# Patient Record
Sex: FEMALE | Race: WHITE | NOT HISPANIC OR LATINO | Employment: FULL TIME | URBAN - METROPOLITAN AREA
[De-identification: names, ages, dates, MRNs, and addresses within clinical notes are randomized per-mention and may not be internally consistent; named-entity substitution may affect disease eponyms.]

---

## 2018-07-04 ENCOUNTER — APPOINTMENT (OUTPATIENT)
Dept: GENERAL RADIOLOGY | Facility: CLINIC | Age: 53
End: 2018-07-04
Attending: PHYSICIAN ASSISTANT
Payer: COMMERCIAL

## 2018-07-04 ENCOUNTER — HOSPITAL ENCOUNTER (EMERGENCY)
Facility: CLINIC | Age: 53
Discharge: HOME | End: 2018-07-04
Attending: EMERGENCY MEDICINE | Admitting: EMERGENCY MEDICINE
Payer: COMMERCIAL

## 2018-07-04 VITALS
SYSTOLIC BLOOD PRESSURE: 131 MMHG | BODY MASS INDEX: 19.3 KG/M2 | OXYGEN SATURATION: 99 % | WEIGHT: 123 LBS | HEART RATE: 73 BPM | RESPIRATION RATE: 18 BRPM | TEMPERATURE: 97.6 F | HEIGHT: 67 IN | DIASTOLIC BLOOD PRESSURE: 83 MMHG

## 2018-07-04 DIAGNOSIS — S91.312A LACERATION OF LEFT FOOT, INITIAL ENCOUNTER: ICD-10-CM

## 2018-07-04 PROCEDURE — 99283 EMERGENCY DEPT VISIT LOW MDM: CPT

## 2018-07-04 PROCEDURE — 12001 RPR S/N/AX/GEN/TRNK 2.5CM/<: CPT | Mod: LT

## 2018-07-04 PROCEDURE — 73660 X-RAY EXAM OF TOE(S): CPT | Mod: LT

## 2018-07-04 ASSESSMENT — ENCOUNTER SYMPTOMS
WOUND: 1
NUMBNESS: 0

## 2018-07-04 NOTE — ED AVS SNAPSHOT
Emergency Department    [address] [facility] [address]    Phone:  [phone]    Fax:  [phone]                                       Denise Soulier   MRN: [id number]    Department:   Emergency Department   Date of Visit:  7/4/2018           Patient Information     Date Of Birth          1965        Your diagnoses for this visit were:     2.25 cm Laceration of left foot, initial encounter 8 sutures used       You were seen by Murtaza Thompson MD.      Follow-up Information     Follow up with  Emergency Department In 10 days.    Specialty:  EMERGENCY MEDICINE    Why:  For suture removal. You may also go to the Return if signs of infection develop.     Contact information:    [address] [facility] [address] [phone]        Discharge Instructions       Watch the area surrounding the wound for signs of infection which can include increased redness, drainage, fevers, or swelling. Inspect the area daily. No swimming or baths for the next 3-5 days, showering is ok. Return in 10 days for stitches removal. You may also go to your primary or another clinic for removal. Apply antibacterial ointment such as bacitracin to the wound daily.         24 Hour Appointment Hotline       To make an appointment at any [facility], call [phone] ([phone]). If you don't have a family doctor or clinic, we will help you find one. [place] clinics are conveniently located to serve the needs of you and your family.             Review of your medicines      Our records show that you are taking the medicines listed below. If these are incorrect, please call your family doctor or clinic.        Dose / Directions Last dose taken    ASTELIN 0.1 % spray   Dose:  1 spray   Generic drug:  azelastine        Spray 1 spray into both nostrils daily   Refills:  0        CALCIUM + D PO        1 tab daily   Refills:  0        IBUPROFEN PO        Refills:  0        MULTIVITAMIN  TABS   OR        1 TABLET DAILY   Refills:  0                Procedures and tests performed during your visit     XR Toe Left G/E 2 Views      Orders Needing Specimen Collection     None      Pending Results     No orders found from 7/2/2018 to 7/5/2018.            Pending Culture Results     No orders found from 7/2/2018 to 7/5/2018.            Pending Results Instructions     If you had any lab results that were not finalized at the time of your Discharge, you can call the ED Lab Result RN at [phone]. You will be contacted by this team for any positive Lab results or changes in treatment. The nurses are available 7 days a week from 10A to 6:30P.  You can leave a message 24 hours per day and they will return your call.        Test Results From Your Hospital Stay        7/4/2018  1:52 PM      Narrative     XR TOE LT G/E 2 VW   7/4/2018 1:28 PM     HISTORY: Laceration to left hallux, possible glass foreign body;     COMPARISON: None.        Impression     IMPRESSION: Mild first MTP joint degenerative changes. No evidence of  fracture or radiopaque foreign body.    YO PEOPLES MD                Clinical Quality Measure: Blood Pressure Screening     Your blood pressure was checked while you were in the emergency department today. The last reading we obtained was  BP: 131/83 . Please read the guidelines below about what these numbers mean and what you should do about them.  If your systolic blood pressure (the top number) is less than 120 and your diastolic blood pressure (the bottom number) is less than 80, then your blood pressure is normal. There is nothing more that you need to do about it.  If your systolic blood pressure (the top number) is 120-139 or your diastolic blood pressure (the bottom number) is 80-89, your blood pressure may be higher than it should be. You should have your blood pressure rechecked within a year by a primary care provider.  If your systolic blood pressure (the top number) is 140 or  "greater or your diastolic blood pressure (the bottom number) is 90 or greater, you may have high blood pressure. High blood pressure is treatable, but if left untreated over time it can put you at risk for heart attack, stroke, or kidney failure. You should have your blood pressure rechecked by a primary care provider within the next 4 weeks.  If your provider in the emergency department today gave you specific instructions to follow-up with your doctor or provider even sooner than that, you should follow that instruction and not wait for up to 4 weeks for your follow-up visit.        Thank you for choosing [facility]       Thank you for choosing [facility] for your care. Our goal is always to provide you with excellent care. Hearing back from our patients is one way we can continue to improve our services. Please take a few minutes to complete the written survey that you may receive in the mail after you visit with us. Thank you!        [facility] Information     [facility] lets you send messages to your doctor, view your test results, renew your prescriptions, schedule appointments and more. To sign up, go to [email or web address] . Click on \"Log in\" on the left side of the screen, which will take you to the Welcome page. Then click on \"Sign up Now\" on the right side of the page.     You will be asked to enter the access code listed below, as well as some personal information. Please follow the directions to create your username and password.     Your access code is: [email or web address]  Expires: 10/2/2018  2:53 PM     Your access code will  in 90 days. If you need help or a new code, please call your [facility] clinic or [phone].        Care EveryWhere ID     This is your Care EveryWhere ID. This could be used by other organizations to access your [facility] medical records  [id number]        Equal Access to Services     BERTHA FRAGOSO AH: yanique Paniagua qaybta kaalmada adeegyada, waxay " ata bender ah. So [facility] [place] [phone].    ATENCIÓN: Si habla español, tiene a mejía disposición servicios gratuitos de asistencia lingüística. Llame al [phone].    We comply with applicable federal civil rights laws and [place] laws. We do not discriminate on the basis of race, color, national origin, age, disability, sex, sexual orientation, or gender identity.            After Visit Summary       This is your record. Keep this with you and show to your community pharmacist(s) and doctor(s) at your next visit.

## 2018-07-04 NOTE — DISCHARGE INSTRUCTIONS
Watch the area surrounding the wound for signs of infection which can include increased redness, drainage, fevers, or swelling. Inspect the area daily. No swimming or baths for the next 3-5 days, showering is ok. Return in 10 days for stitches removal. You may also go to your primary or another clinic for removal. Apply antibacterial ointment such as bacitracin to the wound daily.

## 2018-07-04 NOTE — ED AVS SNAPSHOT
Emergency Department    [address] [place] [address]    Phone:  [phone]    Fax:  [phone]                                       Denise Soulier   MRN: [id number]    Department:   Emergency Department   Date of Visit:  7/4/2018           After Visit Summary Signature Page     I have received my discharge instructions, and my questions have been answered. I have discussed any challenges I see with this plan with the nurse or doctor.    ..........................................................................................................................................  Patient/Patient Representative Signature      ..........................................................................................................................................  Patient Representative Print Name and Relationship to Patient    ..................................................               ................................................  Date                                            Time    ..........................................................................................................................................  Reviewed by Signature/Title    ...................................................              ..............................................  Date                                                            Time

## 2018-07-04 NOTE — ED PROVIDER NOTES
"  History     Chief Complaint:  Laceration     HPI   Denise Soulier is an otherwise healthy 52 year old female who presents with a laceration. The patient reports she was cleaning a picture frame and accidentally stepped on the glass, lacerating the medial aspect of her left great toe. She states the wound was bleeding a lot, prompting her to come to the ED for evaluation. She denies numbness, any other injury, and was able to walk after the injury. Her last tetanus vaccine was in 2011.    Allergies:  Penicillins    Medications:    The patient is currently on no regular medications.     Past Medical History:    GERD  Myoclonus    Past Surgical History:    [place] teeth removed    Family History:    Hyperlipidemia  Asthma  COPD  Rheumatoid arthritis  Alcohol/drug abuse    Social History:  Smoking status: Never smoker  Alcohol use: No   Marital Status:  Single [1]     Review of Systems   Skin: Positive for wound (laceration to medial left great toe).   Neurological: Negative for numbness.   All other systems reviewed and are negative.    Physical Exam     Patient Vitals for the past 24 hrs:   BP Temp Temp src Pulse Resp SpO2 Height Weight   07/04/18 1301 131/83 97.6  F (36.4  C) Oral 73 18 99 % 1.702 m (5' 7\") 55.8 kg (123 lb)      Physical Exam  General: Well appearing, well nourished. Normal mood and affect.  Skin: V-shaped laceration to the medial portion of the left hallux. Bleeding is controlled. No other injuries visualized.  HEENT: Head: Normocephalic, atraumatic, no visible masses.   Eyes: Visual acuity intact, conjunctiva clear, sclera non-icteric.  Cardiac: No cardiomegaly or thrills; normal rate and regular rhythm, no murmur or gallop.  Lungs: Clear to auscultation and percussion   Musculoskeletal: Full ROM of the left hallux. Normal gait and station.   Neurologic: Oriented x 3. Sensation to pain and touch intact throughout bilateral lower extremities.  Psychiatric: Intact recent and remote memory, judgment " and insight, normal mood and affect.     Emergency Department Course   Imaging:  Radiographic findings were communicated with the patient who voiced understanding of the findings.    XR Toe Left G/E 2 Views:  Mild first MTP joint degenerative changes. No evidence of  fracture or radiopaque foreign body.  As read by Radiology.     Procedures:    Narrative: Procedure: Laceration Repair        LACERATION:  A simple clean 2.25 cm V-shaped laceration.      LOCATION:  Left great toe      FUNCTION:  Distally sensation, circulation, motor and tendon function are intact.      ANESTHESIA:  Local using 0.25% bupivacaine total of 3 mLs      PREPARATION:  Irrigation and Scrubbing with Shur Clens      DEBRIDEMENT:  no debridement      CLOSURE:  Wound was closed with One Layer.  Skin closed with 8 x 5.0 Ethylon using interrupted sutures.     Emergency Department Course:  Past medical records, nursing notes, and vitals reviewed.  1308: I performed an exam of the patient and obtained history, as documented above.  The patient was sent for a left toe while in the emergency department, findings above.     1402: Dr. Thompson assessed the patient.    1420: I repaired the patient's laceration, as noted above. Patient tolerated well.     Findings and plan explained to the patient. Patient discharged home with instructions regarding supportive care, medications, and reasons to return. The importance of close follow-up was reviewed.     Impression & Plan    Medical Decision Making:  Denise Soulier is a 51 yo female who presented with a laceration after glass fell on his foot at home. Xray left hallux was obtained and showed no signs of foreign body. The wound was carefully evaluated and explored.  The laceration was closed as noted in the procedure note.  There is no evidence of muscular, tendon, bone, or nerve damage with this laceration.  Possible complications (infection, scarring) were reviewed with the patient.  She was told to watch  the area surrounding the wound for signs of infection which can include increased redness, drainage, fevers, or swelling.  No swimming or baths for the next 3-5 days was advised. She will return or go to her primary in 10 days for stitches/ removal. She will apply antibacterial ointment such as bacitracin to the wound daily. All questions were answered prior to the patient's discharge. He was in agreement with the plan stated above.     Diagnosis:    ICD-10-CM   1. Laceration of left foot, initial encounter S91.312A      Disposition:  discharged to home    Rachna Byrd  7/4/2018    EMERGENCY DEPARTMENT    Rachna WARREN, am serving as a scribe at 1:08 PM on 7/4/2018 to document services personally performed by Kaley Rojas PA-C based on my observations and the provider's statements to me.       Kaley Rojas PA  07/04/18 145

## 2018-07-04 NOTE — ED PROVIDER NOTES
Emergency Department Attending Supervision Note  7/4/2018  2:30 PM      I evaluated this patient in conjunction with Kaley Rojas PA-C.    HPI    Denise Soulier is an otherwise healthy 52 year old female who presents with a toe laceration. The patient reports she was cleaning a picture frame and accidentally stepped on the glass, lacerating the side of her left big toe. She states the wound was bleeding a lot, prompting her to come to the ED for evaluation. She denies numbness, any other injury, and was able to walk after the injury. Her last tetanus vaccine was in 2011.    On my exam:  Nursing note and vitals reviewed.  Constitutional:  Oriented to person, place, and time. Cooperative.   HENT:   Nose:    Nose normal.   Mouth/Throat:   Mucous membranes are normal.   Eyes:    Conjunctivae normal and EOM are normal.      Pupils are equal, round, and reactive to light.   Neck:    Trachea normal.   Cardiovascular:  Normal rate, regular rhythm, normal heart sounds and normal pulses. No murmur heard.  Pulmonary/Chest:  Effort normal and breath sounds normal.   Abdominal:   Soft. Normal appearance and bowel sounds are normal.      There is no tenderness.      There is no rebound and no CVA tenderness.   Musculoskeletal:  Extremities atraumatic x 4.   Lymphadenopathy:  No cervical adenopathy.   Neurological:   Alert and oriented to person, place, and time. Normal strength and sensation to left great toe. No cranial nerve deficit or sensory deficit. GCS eye subscore is 4. GCS verbal subscore is 5. GCS motor subscore is 6.   Skin:    2.25 cm V-shaped laceration along the medial aspect of the left great toe. No rash noted.   Psychiatric:   Normal mood and affect.     Results:    ED course:    My impression is this is a 52-year-old female who came in for further evaluation of a laceration to her left great toe.  She had x-rays obtained to rule out any foreign bodies, and those are negative.  She subsequently had this  repaired per the procedure note by Kaley Rojas PA-C.  She is up-to-date on her tetanus.  She will need to have the sutures removed in about 10 days.        Diagnosis    ICD-10-CM    1. 2.25 cm Laceration of left foot, initial encounter S91.312A     8 sutures used         Murtaza Thompson MD Lashkowitz, Seth H, MD  07/04/18 1621

## 2018-07-14 ENCOUNTER — OFFICE VISIT (OUTPATIENT)
Dept: URGENT CARE | Facility: URGENT CARE | Age: 53
End: 2018-07-14
Payer: COMMERCIAL

## 2018-07-14 DIAGNOSIS — Z53.9 DIAGNOSIS NOT YET DEFINED: Primary | ICD-10-CM

## 2018-07-14 NOTE — MR AVS SNAPSHOT
"              After Visit Summary   2018    Denise Soulier    MRN: [id number]           Patient Information     Date Of Birth          1965        Visit Information        Provider Department      2018 8:40 AM Provider, [facility]        Today's Diagnoses     DIAGNOSIS NOT YET DEFINED    -  1       Follow-ups after your visit        Who to contact     If you have questions or need follow up information about today's clinic visit or your schedule please contact [facility] directly at [phone].  Normal or non-critical lab and imaging results will be communicated to you by [employer], letter or phone within 4 business days after the clinic has received the results. If you do not hear from us within 7 days, please contact the clinic through [employer] or phone. If you have a critical or abnormal lab result, we will notify you by phone as soon as possible.  Submit refill requests through [facility] or call your pharmacy and they will forward the refill request to us. Please allow 3 business days for your refill to be completed.          Additional Information About Your Visit        MyChart Information     [facility] lets you send messages to your doctor, view your test results, renew your prescriptions, schedule appointments and more. To sign up, go to [email or web address] . Click on \"Log in\" on the left side of the screen, which will take you to the Welcome page. Then click on \"Sign up Now\" on the right side of the page.     You will be asked to enter the access code listed below, as well as some personal information. Please follow the directions to create your username and password.     Your access code is: [email or web address]  Expires: 10/2/2018  2:53 PM     Your access code will  in 90 days. If you need help or a new code, please call your [place] clinic or [phone].        Care EveryWhere ID     This is your Care EveryWhere ID. This could " be used by other organizations to access your [facility] medical records  [id number]         Blood Pressure from Last 3 Encounters:   07/04/18 131/83   10/27/15 110/70   09/08/14 114/71    Weight from Last 3 Encounters:   07/04/18 123 lb (55.8 kg)   10/27/15 125 lb 9.6 oz (57 kg)   09/08/14 123 lb (55.8 kg)              Today, you had the following     No orders found for display       Primary Care Provider Fax #    Physician No Ref-Primary [phone]       No address on file        Equal Access to Services     [facility] : Hadii ashley thomaso Sogarcia, waaxda luqadaha, qaybta kaalmada bakari, dmitriy bender . So [facility] [phone].    ATENCIÓN: Si habla español, tiene a mejía disposición servicios gratuitos de asistencia lingüística. Llame al [phone].    We comply with applicable federal civil rights laws and [place] laws. We do not discriminate on the basis of race, color, national origin, age, disability, sex, sexual orientation, or gender identity.            Thank you!     Thank you for choosing [facility]  for your care. Our goal is always to provide you with excellent care. Hearing back from our patients is one way we can continue to improve our services. Please take a few minutes to complete the written survey that you may receive in the mail after your visit with us. Thank you!             Your Updated Medication List - Protect others around you: Learn how to safely use, store and throw away your medicines at www.disposemymeds.org.          This list is accurate as of 7/14/18  1:08 PM.  Always use your most recent med list.                   Brand Name Dispense Instructions for use Diagnosis    ASTELIN 0.1 % spray   Generic drug:  azelastine      Spray 1 spray into both nostrils daily        CALCIUM + D PO      1 tab daily        IBUPROFEN PO           MULTIVITAMIN TABS   OR      1 TABLET DAILY

## 2018-07-14 NOTE — PROGRESS NOTES
Patient presents for suture removal. The wound is well healed without signs of infection.  The sutures are removed. Return prn.  Deandra Huber MA

## 2019-10-28 ENCOUNTER — OFFICE VISIT (OUTPATIENT)
Dept: FAMILY MEDICINE | Facility: CLINIC | Age: 54
End: 2019-10-28
Payer: COMMERCIAL

## 2019-10-28 VITALS
SYSTOLIC BLOOD PRESSURE: 112 MMHG | OXYGEN SATURATION: 99 % | RESPIRATION RATE: 16 BRPM | HEART RATE: 61 BPM | BODY MASS INDEX: 19.77 KG/M2 | WEIGHT: 123 LBS | HEIGHT: 66 IN | TEMPERATURE: 97.8 F | DIASTOLIC BLOOD PRESSURE: 62 MMHG

## 2019-10-28 DIAGNOSIS — Z12.11 SCREEN FOR COLON CANCER: ICD-10-CM

## 2019-10-28 DIAGNOSIS — Z00.00 WELL ADULT HEALTH CHECK: Primary | ICD-10-CM

## 2019-10-28 DIAGNOSIS — M65.311 TRIGGER FINGER OF RIGHT THUMB: ICD-10-CM

## 2019-10-28 DIAGNOSIS — H61.21 IMPACTED CERUMEN OF RIGHT EAR: ICD-10-CM

## 2019-10-28 PROCEDURE — G0145 SCR C/V CYTO,THINLAYER,RESCR: HCPCS | Performed by: NURSE PRACTITIONER

## 2019-10-28 PROCEDURE — 99396 PREV VISIT EST AGE 40-64: CPT | Mod: 25 | Performed by: NURSE PRACTITIONER

## 2019-10-28 PROCEDURE — 87624 HPV HI-RISK TYP POOLED RSLT: CPT | Performed by: NURSE PRACTITIONER

## 2019-10-28 PROCEDURE — 90471 IMMUNIZATION ADMIN: CPT | Performed by: NURSE PRACTITIONER

## 2019-10-28 PROCEDURE — 99213 OFFICE O/P EST LOW 20 MIN: CPT | Mod: 25 | Performed by: NURSE PRACTITIONER

## 2019-10-28 PROCEDURE — 90686 IIV4 VACC NO PRSV 0.5 ML IM: CPT | Performed by: NURSE PRACTITIONER

## 2019-10-28 ASSESSMENT — ENCOUNTER SYMPTOMS
CONSTITUTIONAL NEGATIVE: 1
GASTROINTESTINAL NEGATIVE: 1
ARTHRALGIAS: 1
ALLERGIC/IMMUNOLOGIC NEGATIVE: 1
WEAKNESS: 0
PSYCHIATRIC NEGATIVE: 1
EYES NEGATIVE: 1
CARDIOVASCULAR NEGATIVE: 1
NEUROLOGICAL NEGATIVE: 1
RESPIRATORY NEGATIVE: 1
HEMATOLOGIC/LYMPHATIC NEGATIVE: 1
MYALGIAS: 1
ENDOCRINE NEGATIVE: 1

## 2019-10-28 ASSESSMENT — MIFFLIN-ST. JEOR: SCORE: 1184.67

## 2019-10-28 NOTE — PROGRESS NOTES
SUBJECTIVE:   CC: Denise Soulier is an 54 year old woman who presents for preventive health visit.     Healthy Habits:     Getting at least 3 servings of Calcium per day:  Yes    Bi-annual eye exam:  Yes    Dental care twice a year:  Yes    Sleep apnea or symptoms of sleep apnea:  None    Diet:  Vegetarian/vegan    Frequency of exercise:  4-5 days/week    Duration of exercise:  30-45 minutes    Taking medications regularly:  Yes    Medication side effects:  None    PHQ-2 Total Score: 0    Additional concerns today:  Yes      Wax in her right ear for several months.  Feels plugged.  No pain or discharge.    Would like it irrigated today.      Has had right thumb pain for a few weeks.    Hurts to bend it.    Not swollen or red.    thumb pain X 2 weeks   No specific injury or trauma.    Feels tight.      Today's PHQ-2 Score:   PHQ-2 ( 1999 Pf[employer]) 10/28/2019   Q1: Little interest or pleasure in doing things 0   Q2: Feeling down, depressed or hopeless 0   PHQ-2 Score 0   Q1: Little interest or pleasure in doing things Not at all   Q2: Feeling down, depressed or hopeless Not at all   PHQ-2 Score 0       Abuse: Current or Past(Physical, Sexual or Emotional)- No  Do you feel safe in your environment? Yes    Social History     Tobacco Use     Smoking status: Never Smoker     Smokeless tobacco: Never Used   Substance Use Topics     Alcohol use: No       Alcohol Use 10/28/2019   Prescreen: >3 drinks/day or >7 drinks/week? Not Applicable   Prescreen: >3 drinks/day or >7 drinks/week? -     Reviewed orders with patient.  Reviewed health maintenance and updated orders accordingly - Yes  Mammogram Screening: Patient over age 50, mutual decision to screen reflected in health maintenance.    Pertinent mammograms are reviewed under the imaging tab.  History of abnormal Pap smear: NO - age 30-65 PAP every 5 years with negative HPV co-testing recommended  PAP / HPV 10/27/2015 3/7/2011   PAP OTHER-NIL, See Result NIL     Reviewed and  "updated as needed this visit by clinical staff  Tobacco  Allergies  Meds  Med Hx  Surg Hx  Fam Hx  Soc Hx        Reviewed and updated as needed this visit by Provider          Review of Systems   Constitutional: Negative.    HENT: Positive for ear pain.    Eyes: Negative.  Negative for visual disturbance.   Respiratory: Negative.    Cardiovascular: Negative.    Gastrointestinal: Negative.    Endocrine: Negative.    Breasts:  negative.    Genitourinary: Negative.  Negative for genital sores.   Musculoskeletal: Positive for arthralgias and myalgias.   Allergic/Immunologic: Negative.    Neurological: Negative.  Negative for weakness.   Hematological: Negative.    Psychiatric/Behavioral: Negative.         OBJECTIVE:   /62   Pulse 61   Temp 97.8  F (36.6  C)   Resp 16   Ht 1.676 m (5' 6\")   Wt 55.8 kg (123 lb)   SpO2 99%   BMI 19.85 kg/m    Physical Exam  GENERAL APPEARANCE: healthy, alert and no distress  EYES: Eyes grossly normal to inspection, PERRL and conjunctivae and sclerae normal  HENT: nose and mouth without ulcers or lesions, oropharynx clear, oral mucous membranes moist, right ear: occluded with wax and left ear: normal: no effusions, no erythema, normal landmarks  NECK: no adenopathy, no asymmetry, masses, or scars and thyroid normal to palpation  RESP: lungs clear to auscultation - no rales, rhonchi or wheezes  CV: regular rate and rhythm, normal S1 S2, no S3 or S4, no murmur, click or rub, no peripheral edema and peripheral pulses strong  ABDOMEN: soft, nontender, no hepatosplenomegaly, no masses and bowel sounds normal  MS: right thumb rigid and tender with flexion.  No erythema or edema noted.  no musculoskeletal defects are noted and gait is age appropriate without ataxia  SKIN: no suspicious lesions or rashes  NEURO: Normal strength and tone, sensory exam grossly normal, mentation intact and speech normal  PSYCH: mentation appears normal and affect normal/bright      ASSESSMENT/PLAN: " "      ICD-10-CM    1. Well adult health check Z00.00 MA SCREENING DIGITAL BILAT - Future  (s+30)     Pap imaged thin layer screen with HPV - recommended age 30 - 65 years (select HPV order below)     HPV High Risk Types DNA Cervical   2. Trigger finger of right thumb M65.311 ORTHO  REFERRAL   3. Impacted cerumen of right ear H61.21    4. Screen for colon cancer Z12.11 Fecal colorectal cancer screen FIT - Future (S+30)      well female, not fasting for labs.  Encouraged to continue exercise and healthy diet choices.      Will refer to ortho for right thumb.      Right ear irrigated without significant discharge.    Cerumen removal attempted by APRN with curette without results.    Cerumen impaction remains.    Recommend she trial bebrox ointment BIID and f/u with ENT if persists or worsens.        COUNSELING:  Reviewed preventive health counseling, as reflected in patient instructions    Estimated body mass index is 19.85 kg/m  as calculated from the following:    Height as of this encounter: 1.676 m (5' 6\").    Weight as of this encounter: 55.8 kg (123 lb).         reports that she has never smoked. She has never used smokeless tobacco.      Counseling Resources:  ATP IV Guidelines  Pooled Cohorts Equation Calculator  Breast Cancer Risk Calculator  FRAX Risk Assessment  ICSI Preventive Guidelines  Dietary Guidelines for Americans, 2010  USDA's MyPlate  ASA Prophylaxis  Lung CA Screening    CALLIE Granados CNP  [facility]  "

## 2019-10-28 NOTE — NURSING NOTE
Denise Soulier is a 54 year old female who presents in clinic with complaint of impacted ear wax (cerumen).  Per the order of Sara, ear wax was removed from left side  by flushing with warm water solution and manual debridement has not been performed. Patient denies pain/dizziness/discharge/drainage  (if yes, stop procedure and huddle with provider).  Ear wax has been successfully removed. (If not, huddle with provider).   Neyda Davis MA

## 2019-10-28 NOTE — LETTER
November 6, 2019    Denise Soulier  [address]   [place] [place] [facility] [address]    Dear Ms.Soulier,  This letter is regarding your recent Pap smear (cervical cancer screening) and Human Papillomavirus (HPV) test.  We are happy to inform you that your Pap smear result is normal. Cervical cancer is closely linked with certain types of HPV. Your results showed no evidence of high-risk HPV.  We recommend you have your next PAP smear and HPV test in 5 years.  You will still need to return to the clinic every year for an annual exam and other preventive tests.  If you have additional questions regarding this result, please call our registered nurse, Sanjuanita at [phone].  Sincerely,    CALLIE Granados CNP //[facility]

## 2019-10-29 ENCOUNTER — DOCUMENTATION ONLY (OUTPATIENT)
Dept: CARE COORDINATION | Facility: CLINIC | Age: 54
End: 2019-10-29

## 2019-10-29 DIAGNOSIS — Z12.11 SCREEN FOR COLON CANCER: ICD-10-CM

## 2019-10-29 PROCEDURE — 82274 ASSAY TEST FOR BLOOD FECAL: CPT | Performed by: NURSE PRACTITIONER

## 2019-10-29 NOTE — TELEPHONE ENCOUNTER
DIAGNOSIS: Trigger finger of right thumb/MANPREET/More Ruiz, APRN CNP [facility]   APPOINTMENT DATE:11.6.19   NOTES STATUS DETAILS   OFFICE NOTE from referring provider Internal 10.28.19 Dr. Ruiz   OFFICE NOTE from other specialist N/A    DISCHARGE SUMMARY from hospital N/A    DISCHARGE REPORT from the ER N/A    OPERATIVE REPORT N/A    MEDICATION LIST Internal    IMPLANT RECORD/STICKER N/A    LABS     CBC/DIFF Internal 7.11.11   CULTURES N/A    INJECTIONS DONE IN RADIOLOGY N/A    MRI N/A    CT SCAN N/A    XRAYS (IMAGES & REPORTS) N/A    TUMOR     PATHOLOGY  Slides & report N/A

## 2019-11-01 LAB
COPATH REPORT: NORMAL
PAP: NORMAL

## 2019-11-02 LAB — HEMOCCULT STL QL IA: NEGATIVE

## 2019-11-04 LAB
FINAL DIAGNOSIS: NORMAL
HPV HR 12 DNA CVX QL NAA+PROBE: NEGATIVE
HPV16 DNA SPEC QL NAA+PROBE: NEGATIVE
HPV18 DNA SPEC QL NAA+PROBE: NEGATIVE
SPECIMEN DESCRIPTION: NORMAL
SPECIMEN SOURCE CVX/VAG CYTO: NORMAL

## 2019-11-05 ENCOUNTER — HEALTH MAINTENANCE LETTER (OUTPATIENT)
Age: 54
End: 2019-11-05

## 2019-11-05 ASSESSMENT — ENCOUNTER SYMPTOMS
MUSCLE WEAKNESS: 0
SINUS CONGESTION: 1
STIFFNESS: 1
NECK MASS: 0
JOINT SWELLING: 0
HOARSE VOICE: 0
ARTHRALGIAS: 0
NECK PAIN: 0
MYALGIAS: 0
MUSCLE CRAMPS: 0
SMELL DISTURBANCE: 0
SORE THROAT: 0
SINUS PAIN: 0
TROUBLE SWALLOWING: 0
BACK PAIN: 0
TASTE DISTURBANCE: 0

## 2019-11-06 ENCOUNTER — OFFICE VISIT (OUTPATIENT)
Dept: ORTHOPEDICS | Facility: CLINIC | Age: 54
End: 2019-11-06
Payer: COMMERCIAL

## 2019-11-06 ENCOUNTER — OFFICE VISIT (OUTPATIENT)
Dept: ORTHOPEDICS | Facility: CLINIC | Age: 54
End: 2019-11-06

## 2019-11-06 ENCOUNTER — PRE VISIT (OUTPATIENT)
Dept: ORTHOPEDICS | Facility: CLINIC | Age: 54
End: 2019-11-06

## 2019-11-06 DIAGNOSIS — M65.311 TRIGGER THUMB OF RIGHT HAND: Primary | ICD-10-CM

## 2019-11-06 NOTE — LETTER
2019       RE: Denise Soulier  [address]  [place] [address]     Dear Colleague,    Thank you for referring your patient, Denise Soulier, to the [facility] ORTHOPAEDIC [facility]INIC at [facility]. Please see a copy of my visit note below.    [facility]  Orthopedics  Pete Chandra MD  2019     Name: Denise Soulier  MRN: [id number]  Age: 54 year old  : 1965  Referring provider: More Ruiz     Chief Complaint: Right thumb triggering    History of Present Illness:   Ms. Soulier is a 54 year old right handed female who presents for evaluation of right thumb triggering for the past month.  She describes a feeling of her thumb catching and locking with flexion and associated dull, achy pain over the base of her thumb. Occasionally her thumb becomes stuck and she must manually release it with her other hand.  She has been taking ibuprofen and icing her thumb with minimal relief of her pain and continues to have triggering every time she flexes her thumb.  She has not previously sought treatment such as injections in the past. She works doing  for [email or web address].     Review of Systems:   A 10-point review of systems was obtained and is negative except for as noted in the HPI.     Medications:     Current Outpatient Medications:      azelastine (ASTELIN) 0.1 % nasal spray, Spray 1 spray into both nostrils daily, Disp: , Rfl:      CALCIUM + D OR, 1 tab daily, Disp: , Rfl:      IBUPROFEN PO, , Disp: , Rfl:      MULTIVITAMIN TABS   OR, 1 TABLET DAILY, Disp: , Rfl:     Allergies:  Penicillins     Past Medical History:  Allergies  GERD  Myoclonus  Sinus infection    Past Surgical History:  The patient does not have any pertinent past surgical history.    Social History:  Works as a . She lives alone. She denies tobacco use and denies alcohol use.     Family History:  Mother - Hyperlipidemia, Asthma, Chronic Obstructive Pulmonary  Disease, Rheumatoid arthritis, bronchitis  Father - Substance use  PGM - Diabetes    Physical Examination:  There were no vitals taken for this visit.  General: Healthy appearing female. Affect appropriate. Normal gait. Alert and oriented to surroundings.   Right Upper Extremity:   Skin intact. No deformity. Right thumb is tender over the A1 pulley area with a palpable nodule over the volar aspect of the MCP joint. There is no swelling, erythema or signs of infection. She has full range of motion at the thumb PIP and MCP, though with significant palpable triggering and locking of the thumb.  Sensation is intact in the radial, ulnar and median nerve distributions.  Fingers are warm and well perfused.     Assessment:   54 year old female with significant right trigger thumb symptoms for the past month with locking.     Plan:   We  had a lengthy discussion about the diagnosis of trigger thumb and possible treatment options. We discussed three possible treatment options: continued observation, repeat injection, and right trigger thumb release. . I discussed the risks of surgery with her including infection, bleeding, pain, scarring, damage to nerves, blood vessels, or other nearby structures, recurrence, prolonged stiffness and/or discomfort, tendon dysfunction, and need for further surgery.  The patient expressed understanding and informed consent was obtained for a right thumb trigger release.  This will be done under local only anesthesia.  The office will call the patient to schedule surgery.    I, Bolivar Walker, a scribe, prepared the chart for today's encounter.      Again, thank you for allowing me to participate in the care of your patient.      Sincerely,    Pete Chandra MD

## 2019-11-06 NOTE — LETTER
11/6/2019       RE: Denise Soulier  [address]  [place] [address]     Dear Colleague,    Thank you for referring your patient, Denise Soulier, to the [facility] ORTHOPAEDIC CLINIC at [facility]. Please see a copy of my visit note below.    Date of Service: Nov 6, 2019    Chief Complaint: Right trigger thumb    History of Present Illness: Denise Soulier is a 54 year old, female who presents today for further evaluation of right thumb triggering. This has been going on for a few months. There has been no obvious inciting event. The thumb locks painfully and she has to straighten it out with the other hand. This has not been getting better. It is interfering with her function and hurts.     Review of Systems: A 14-point review of systems was obtained on intake reviewed. It is included at the bottom of this note.     Past Medical History:   Diagnosis Date     Allergies     seasonal spring and fall     GERD (gastroesophageal reflux disease)      Myoclonus Sept, 08     Sinus infection     chronic         Past Surgical History:   Procedure Laterality Date     SURGICAL HISTORY OF -       [facility]         Current Outpatient Medications:      azelastine (ASTELIN) 0.1 % nasal spray, Spray 1 spray into both nostrils daily, Disp: , Rfl:      CALCIUM + D OR, 1 tab daily, Disp: , Rfl:      IBUPROFEN PO, , Disp: , Rfl:      MULTIVITAMIN TABS   OR, 1 TABLET DAILY, Disp: , Rfl:     Allergies   Allergen Reactions     Penicillins        Social History     Socioeconomic History     Marital status: Single     Spouse name: Not on file     Number of children: Not on file     Years of education: Not on file     Highest education level: Not on file   Occupational History     Not on file   Social Needs     Financial resource strain: Not on file     Food insecurity:     Worry: Not on file     Inability: Not on file     Transportation needs:     Medical: Not on file     Non-medical:  Not on file   Tobacco Use     Smoking status: Never Smoker     Smokeless tobacco: Never Used   Substance and Sexual Activity     Alcohol use: No     Drug use: No     Sexual activity: Not Currently   Lifestyle     Physical activity:     Days per week: Not on file     Minutes per session: Not on file     Stress: Not on file   Relationships     Social connections:     Talks on phone: Not on file     Gets together: Not on file     Attends Hinduism service: Not on file     Active member of club or organization: Not on file     Attends meetings of clubs or organizations: Not on file     Relationship status: Not on file     Intimate partner violence:     Fear of current or ex partner: Not on file     Emotionally abused: Not on file     Physically abused: Not on file     Forced sexual activity: Not on file   Other Topics Concern     Parent/sibling w/ CABG, MI or angioplasty before 65F 55M? No   Social History Narrative     Not on file       Family History   Problem Relation Age of Onset     Lipids Mother      Asthma Mother      Chronic Obstructive Pulmonary Disease Mother      Rheumatoid Arthritis Mother      Bronchitis Mother      Alcohol/Drug Father      Diabetes Paternal Grandmother        Physical examination:  There were no vitals taken for this visit.    Well-developed, well-nourished and in no acute distress.  Alert and oriented to surroundings.  On examination of the right hand, skin is clean and dry. There is no deformity. She has full ROM of all digits and thumb. There is sima triggering of the thumb. She is TTP at the A1 pulley of the thumb. THere is palpable nodularity here. SILT thorughout.  Fingers are warm and well-perfused.       Assessment: 54 year old female with  Right trigger thumb    Plan:   I discussed the treatment options with the patient, namely continued observation, injection, and trigger release. I discussed the risks of surgery with her including infection, bleeding, pain, scarring, damage to  nerves, blood vessels, or other nearby structures, recurrence, prolonged stiffness and/or discomfort, tendon dysfunction, and need for further surgery.  The patient expressed understanding and informed consent was obtained for a right thumb trigger release.  This will be done under local only anesthesia.  The office will call the patient to schedule surgery.  .         Again, thank you for allowing me to participate in the care of your patient.      Sincerely,    Pete Chandra MD

## 2019-11-06 NOTE — NURSING NOTE
Reason For Visit:   Chief Complaint   Patient presents with     Right Hand - Eval/Assessment     Consult For     right trigger thumb        Primary MD: No Ref-Primary, Physician  Ref. MD: Krys Ruiz     ?  No    Age: 54 year old    Occupation  .  Currently working? Yes.  Work status?  Full time.  Date of injury: NA  Type of injury: NA.  Date of surgery: NA  Type of surgery: NA.  Smoker: No  Request smoking cessation information: No      There were no vitals taken for this visit.      Pain Assessment  Patient Currently in Pain: Yes  0-10 Pain Scale: 4               QuickDASH Assessment  No flowsheet data found.       Allergies   Allergen Reactions     Penicillins        Rina Gonzalez, ATC

## 2019-11-06 NOTE — LETTER
Date:November 12, 2019      Provider requested that no letter be sent. Do not send.       [facility] Health Information

## 2019-11-06 NOTE — PROGRESS NOTES
[facility]  Orthopedics  Pete Chandra MD  2019     Name: Denise Soulier  MRN: [id number]  Age: 54 year old  : 1965  Referring provider: More Ruiz     Chief Complaint: Right thumb triggering    History of Present Illness:   Ms. Soulier is a 54 year old right handed female who presents for evaluation of right thumb triggering for the past month.  She describes a feeling of her thumb catching and locking with flexion and associated dull, achy pain over the base of her thumb. Occasionally her thumb becomes stuck and she must manually release it with her other hand.  She has been taking ibuprofen and icing her thumb with minimal relief of her pain and continues to have triggering every time she flexes her thumb.  She has not previously sought treatment such as injections in the past. She works doing  for [employer].     Review of Systems:   A 10-point review of systems was obtained and is negative except for as noted in the HPI.     Medications:     Current Outpatient Medications:      azelastine (ASTELIN) 0.1 % nasal spray, Spray 1 spray into both nostrils daily, Disp: , Rfl:      CALCIUM + D OR, 1 tab daily, Disp: , Rfl:      IBUPROFEN PO, , Disp: , Rfl:      MULTIVITAMIN TABS   OR, 1 TABLET DAILY, Disp: , Rfl:     Allergies:  Penicillins     Past Medical History:  Allergies  GERD  Myoclonus  Sinus infection    Past Surgical History:  The patient does not have any pertinent past surgical history.    Social History:  Works as a . She lives alone. She denies tobacco use and denies alcohol use.     Family History:  Mother - Hyperlipidemia, Asthma, Chronic Obstructive Pulmonary Disease, Rheumatoid arthritis, bronchitis  Father - Substance use  PGM - Diabetes    Physical Examination:  There were no vitals taken for this visit.  General: Healthy appearing female. Affect appropriate. Normal gait. Alert and oriented to surroundings.   Right Upper Extremity:   Skin intact. No  deformity. Right thumb is tender over the A1 pulley area with a palpable nodule over the volar aspect of the MCP joint. There is no swelling, erythema or signs of infection. She has full range of motion at the thumb PIP and MCP, though with significant palpable triggering and locking of the thumb.  Sensation is intact in the radial, ulnar and median nerve distributions.  Fingers are warm and well perfused.     Assessment:   54 year old female with significant right trigger thumb symptoms for the past month with locking.     Plan:   We  had a lengthy discussion about the diagnosis of trigger thumb and possible treatment options. We discussed three possible treatment options: continued observation, repeat injection, and right trigger thumb release. . I discussed the risks of surgery with her including infection, bleeding, pain, scarring, damage to nerves, blood vessels, or other nearby structures, recurrence, prolonged stiffness and/or discomfort, tendon dysfunction, and need for further surgery.  The patient expressed understanding and informed consent was obtained for a right thumb trigger release.  This will be done under local only anesthesia.  The office will call the patient to schedule surgery.         IBolivar, a scribe, prepared the chart for today's encounter.        Answers for HPI/ROS submitted by the patient on 11/5/2019   General Symptoms: No  Skin Symptoms: No  HENT Symptoms: Yes  EYE SYMPTOMS: No  HEART SYMPTOMS: No  LUNG SYMPTOMS: No  INTESTINAL SYMPTOMS: No  URINARY SYMPTOMS: No  GYNECOLOGIC SYMPTOMS: No  BREAST SYMPTOMS: No  SKELETAL SYMPTOMS: Yes  BLOOD SYMPTOMS: No  NERVOUS SYSTEM SYMPTOMS: No  MENTAL HEALTH SYMPTOMS: No  Ear pain: No  Ear discharge: No  Hearing loss: No  Tinnitus: No  Nosebleeds: No  Congestion: Yes  Sinus pain: No  Trouble swallowing: No   Voice hoarseness: No  Mouth sores: No  Sore throat: No  Tooth pain: No  Gum tenderness: No  Bleeding gums: No  Change in taste:  No  Change in sense of smell: No  Dry mouth: No  Hearing aid used: No  Neck lump: No  Back pain: No  Muscle aches: No  Neck pain: No  Swollen joints: No  Joint pain: No  Bone pain: No  Muscle cramps: No  Muscle weakness: No  Joint stiffness: Yes  Bone fracture: No

## 2019-11-07 ENCOUNTER — TELEPHONE (OUTPATIENT)
Dept: ORTHOPEDICS | Facility: CLINIC | Age: 54
End: 2019-11-07

## 2019-11-07 PROBLEM — M65.311 TRIGGER THUMB OF RIGHT HAND: Status: ACTIVE | Noted: 2019-11-07

## 2019-11-07 NOTE — TELEPHONE ENCOUNTER
Patient is scheduled for surgery with Dr. Chandra      Spoke or left message with: Spoke with Eleanor    Date of Surgery: 12/12/19    Location: [facility]    Informed patient they will need an adult  Yes    H&P: N/A local only    Additional imaging/appointments: N/A    Surgery packet: Given in clinic    Additional comments: Patient will await pre op phone call 1-2 days prior to surgery for arrival time

## 2019-11-07 NOTE — TELEPHONE ENCOUNTER
Attempted to reach out to patient to discuss scheduling surgery with Dr. Chandra. Left detailed message that Dr. Chandra dose these types of surgeries on Thursdays with the first available being 12/5. Left best call back number of [phone]

## 2019-11-11 NOTE — PROGRESS NOTES
Date of Service: Nov 6, 2019    Chief Complaint: Right trigger thumb    History of Present Illness: Denise Soulier is a 54 year old, female who presents today for further evaluation of right thumb triggering. This has been going on for a few months. There has been no obvious inciting event. The thumb locks painfully and she has to straighten it out with the other hand. This has not been getting better. It is interfering with her function and hurts.     Review of Systems: A 14-point review of systems was obtained on intake reviewed. It is included at the bottom of this note.     Past Medical History:   Diagnosis Date     Allergies     seasonal spring and fall     GERD (gastroesophageal reflux disease)      Myoclonus Sept, 08     Sinus infection     chronic         Past Surgical History:   Procedure Laterality Date     SURGICAL HISTORY OF -       [facility]         Current Outpatient Medications:      azelastine (ASTELIN) 0.1 % nasal spray, Spray 1 spray into both nostrils daily, Disp: , Rfl:      CALCIUM + D OR, 1 tab daily, Disp: , Rfl:      IBUPROFEN PO, , Disp: , Rfl:      MULTIVITAMIN TABS   OR, 1 TABLET DAILY, Disp: , Rfl:     Allergies   Allergen Reactions     Penicillins        Social History     Socioeconomic History     Marital status: Single     Spouse name: Not on file     Number of children: Not on file     Years of education: Not on file     Highest education level: Not on file   Occupational History     Not on file   Social Needs     Financial resource strain: Not on file     Food insecurity:     Worry: Not on file     Inability: Not on file     Transportation needs:     Medical: Not on file     Non-medical: Not on file   Tobacco Use     Smoking status: Never Smoker     Smokeless tobacco: Never Used   Substance and Sexual Activity     Alcohol use: No     Drug use: No     Sexual activity: Not Currently   Lifestyle     Physical activity:     Days per week: Not on file     Minutes per session: Not on file      Stress: Not on file   Relationships     Social connections:     Talks on phone: Not on file     Gets together: Not on file     Attends [employer] service: Not on file     Active member of club or organization: Not on file     Attends meetings of clubs or organizations: Not on file     Relationship status: Not on file     Intimate partner violence:     Fear of current or ex partner: Not on file     Emotionally abused: Not on file     Physically abused: Not on file     Forced sexual activity: Not on file   Other Topics Concern     Parent/sibling w/ CABG, MI or angioplasty before 65F 55M? No   Social History Narrative     Not on file       Family History   Problem Relation Age of Onset     Lipids Mother      Asthma Mother      Chronic Obstructive Pulmonary Disease Mother      Rheumatoid Arthritis Mother      Bronchitis Mother      Alcohol/Drug Father      Diabetes Paternal Grandmother        Physical examination:  There were no vitals taken for this visit.    Well-developed, well-nourished and in no acute distress.  Alert and oriented to surroundings.  On examination of the right hand, skin is clean and dry. There is no deformity. She has full ROM of all digits and thumb. There is sima triggering of the thumb. She is TTP at the A1 pulley of the thumb. THere is palpable nodularity here. SILT thorughout.  Fingers are warm and well-perfused.       Assessment: 54 year old female with  Right trigger thumb    Plan:   I discussed the treatment options with the patient, namely continued observation, injection, and trigger release. I discussed the risks of surgery with her including infection, bleeding, pain, scarring, damage to nerves, blood vessels, or other nearby structures, recurrence, prolonged stiffness and/or discomfort, tendon dysfunction, and need for further surgery.  The patient expressed understanding and informed consent was obtained for a right thumb trigger release.  This will be done under local only  anesthesia.  The office will call the patient to schedule surgery.  .

## 2019-11-29 ENCOUNTER — ANCILLARY PROCEDURE (OUTPATIENT)
Dept: MAMMOGRAPHY | Facility: CLINIC | Age: 54
End: 2019-11-29
Attending: NURSE PRACTITIONER
Payer: COMMERCIAL

## 2019-11-29 DIAGNOSIS — Z00.00 WELL ADULT HEALTH CHECK: ICD-10-CM

## 2019-11-29 PROCEDURE — 77067 SCR MAMMO BI INCL CAD: CPT | Mod: TC

## 2019-12-11 DIAGNOSIS — M65.311 TRIGGER THUMB OF RIGHT HAND: Primary | ICD-10-CM

## 2019-12-11 RX ORDER — HYDROCODONE BITARTRATE AND ACETAMINOPHEN 5; 325 MG/1; MG/1
1 TABLET ORAL EVERY 6 HOURS PRN
Qty: 10 TABLET | Refills: 0 | Status: SHIPPED | OUTPATIENT
Start: 2019-12-12 | End: 2021-11-09

## 2019-12-11 NOTE — DISCHARGE INSTRUCTIONS
"Instructions for after your surgery    Keep the surgical dressing on and dry for 3 days. After 3 days, you may remove the surgical dressing and begin getting the wound wet in the shower and washing it gently with soap and water on a daily basis. Please keep wound covered until your follow-up visit. After the surgical dressing comes off, you may use a bandaid or a \"Coban\"-type dressing with gauze for this.  Avoid prolonged immersion of incision in water (such as tub baths, swimming, hot tubs, and dish washing without gloves) until skin is healed (about 3 weeks)     Keep your operative arm elevated as much as possible. This will help with pain and swelling.     Do not lift anything heavier than a coffee cup with your operative hand. You can use it for light activities like eating and brushing your teeth.    You will have a follow-up appointment scheduled with a nurse or  for stitch removal prior to your 6 week post-op appointment with Dr. Chandra. If you do not know when this appointment is, please call Dr. Chandra's office to find out.     Call Dr. Chandra's office if you experience any of the following:   Fevers, chills, increasing wound drainage, pain that is not controlled with the medications you have been prescribing, swelling that is not controlled with elevation, problems with your splint, or any other questions or concerns.           "

## 2019-12-12 ENCOUNTER — HOSPITAL ENCOUNTER (OUTPATIENT)
Facility: AMBULATORY SURGERY CENTER | Age: 54
End: 2019-12-12
Attending: ORTHOPAEDIC SURGERY
Payer: COMMERCIAL

## 2019-12-12 VITALS
TEMPERATURE: 98.5 F | SYSTOLIC BLOOD PRESSURE: 107 MMHG | OXYGEN SATURATION: 100 % | DIASTOLIC BLOOD PRESSURE: 69 MMHG | HEART RATE: 69 BPM | RESPIRATION RATE: 16 BRPM

## 2019-12-12 DIAGNOSIS — M65.311 TRIGGER THUMB OF RIGHT HAND: ICD-10-CM

## 2019-12-12 RX ORDER — LIDOCAINE HYDROCHLORIDE AND EPINEPHRINE 10; 10 MG/ML; UG/ML
10 INJECTION, SOLUTION INFILTRATION; PERINEURAL ONCE
Status: COMPLETED | OUTPATIENT
Start: 2019-12-12 | End: 2019-12-12

## 2019-12-12 RX ADMIN — LIDOCAINE HYDROCHLORIDE AND EPINEPHRINE 10 ML: 10; 10 INJECTION, SOLUTION INFILTRATION; PERINEURAL at 07:15

## 2019-12-12 NOTE — OP NOTE
PREOPERATIVE DIAGNOSIS: right thumb finger trigger finger       POSTOPERATIVE DIAGNOSIS:  right thumb finger trigger finger       PROCEDURE PERFORMED:   right thumb finger trigger  release      ATTENDING PHYSICIAN:  Pete Chandra MD        ASSISTANT: None      ANESTHESIA:  Local anesthesia only consisting of 4 mL of 1% lidocaine with epinephrine 1:100,000       ESTIMATED BLOOD LOSS:  1 mL        TOURNIQUET TIME: 6 min      IMPLANTS:  None.         SPECIMENS: None.      FINDINGS:  Thickened A1 pulley. Hourglassing of FPL at A1 pulley level.       INDICATIONS: This patient is a  54 year old year old female who presented to clinic with a chief complaint of right thumb finger catching and locking.  The patient elected to proceed with surgery. Risks of surgery were discussed including but not limited to infection, bleeding, wound healing problems, damage to surrounding structures including nerves, blood vessels, and tendon, recurrence, stiffness, and persistent pain. The patient expressed understanding and wished to proceed with the above surgery. Informed consent was obtained.       PROCEDURE:   The patient was identified in the preoperative area. The surgical site was marked. 4 mL of 1% lidocaine with 1:100,000 epinephrine was injected into the subcutaneous tissues at the surgical site. The patient was brought back to the operating room and positioned with the operative extremity over the hand table. A forearm tourniquet was placed. The site was prepped and draped in the usual sterile fashion. A timeout was performed confirming patient, site of surgery, and procedure to be performed. The arm was exsanguinated and the tourniquet inflated to 250 mmHg. A longitudinal incision was made overlying the A1 pulley. Blunt dissection was performed down to the flexor tendon sheath. Right angle retractors were placed on either side of the flexor sheath throughout the procedure to protect the neurovascular bundles. All soft tissue  overlying the flexor tendon sheath was gently freed and retracted.  The A1 pulley was identified and was longitudinally incised. Tenotomy scissors were used to complete the release proximally and distally to the level of the A2 pulley. Following the release, a right angle retractor was used to deliver the FDS and FDP tendons out of the flexor sheath for inspection. They did not catch or lock as this was done. The tendon was released and the patient was asked to flex and extend the digit which she was able to do without triggering or locking. The wound was irrigated copiously. Tourniquet was taken down and hemostasis was achieved. The wound was closed with 4-0 nylon horizontal mattress sutures. A sterile dressing was applied of adaptic, 4 x 4's, fluffs, quique, and Coban. Digital perfusion was excellent with good capillary refill. The patient was brought to the recovery room in stable condition.  All sponge, needle and instrument counts were correct at the end of the case.       PLAN: The dressing will remain clean, dry and intact for 5 days.  After that time the dressing may be removed, the incision may get wet in the shower and a Band-Aid may be used for coverage of the wound.  The patient should return to clinic for suture removal in 10-14 days. The patient should not lift anything heavier than the weight of a coffee cup before this time.

## 2019-12-13 ENCOUNTER — HOSPITAL ENCOUNTER (OUTPATIENT)
Dept: MAMMOGRAPHY | Facility: CLINIC | Age: 54
Discharge: HOME | End: 2019-12-13
Attending: NURSE PRACTITIONER | Admitting: NURSE PRACTITIONER
Payer: COMMERCIAL

## 2019-12-13 ENCOUNTER — HOSPITAL ENCOUNTER (OUTPATIENT)
Dept: MAMMOGRAPHY | Facility: CLINIC | Age: 54
End: 2019-12-13
Attending: NURSE PRACTITIONER
Payer: COMMERCIAL

## 2019-12-13 DIAGNOSIS — R92.8 ABNORMAL MAMMOGRAM: ICD-10-CM

## 2019-12-13 PROCEDURE — 76642 ULTRASOUND BREAST LIMITED: CPT | Mod: RT

## 2019-12-13 PROCEDURE — G0279 TOMOSYNTHESIS, MAMMO: HCPCS

## 2019-12-24 ASSESSMENT — ENCOUNTER SYMPTOMS
STIFFNESS: 1
MUSCLE WEAKNESS: 0
ARTHRALGIAS: 1
NECK PAIN: 0
MUSCLE CRAMPS: 0
MYALGIAS: 0
JOINT SWELLING: 0
BACK PAIN: 0

## 2019-12-26 ENCOUNTER — TELEPHONE (OUTPATIENT)
Dept: ORTHOPEDICS | Facility: CLINIC | Age: 54
End: 2019-12-26

## 2019-12-26 NOTE — TELEPHONE ENCOUNTER
Attempted to call patient and give her the option to come in to be seen today for her post op appointment. Voicemailbox was not set up so I was unable to leave a message. Transfer call to Rina if she calls back.

## 2019-12-27 ENCOUNTER — ALLIED HEALTH/NURSE VISIT (OUTPATIENT)
Dept: ORTHOPEDICS | Facility: CLINIC | Age: 54
End: 2019-12-27
Payer: COMMERCIAL

## 2019-12-27 DIAGNOSIS — M65.311 TRIGGER THUMB OF RIGHT HAND: Primary | ICD-10-CM

## 2019-12-27 NOTE — PROGRESS NOTES
Reason for visit:    Denise Soulier came in to the clinic for a two week post op check.    Her surgery was done 12/12/19 by Dr Chandra.  She had right trigger thumb release.     Assessment:    Eleanor came into the clinic in a bandage.     The Surgical wounds were exposed and found to be well-healed; so the sutures were removed.    Plan:     She was placed in another band aid.  She was told to keep the area clean and dry, not to soak or submerge her hand in water, not to apply any lotions, ointments, or creams over the area, and not to lift anything greater than 5 lbs.      She has an appointment to see Dr. Chandra at that time Dr. Chandra will determine further restrictions.    She has our phone number and will call with questions or problems.        Answers for HPI/ROS submitted by the patient on 12/24/2019   General Symptoms: No  Skin Symptoms: No  HENT Symptoms: No  EYE SYMPTOMS: No  HEART SYMPTOMS: No  LUNG SYMPTOMS: No  INTESTINAL SYMPTOMS: No  URINARY SYMPTOMS: No  GYNECOLOGIC SYMPTOMS: No  BREAST SYMPTOMS: No  SKELETAL SYMPTOMS: Yes  BLOOD SYMPTOMS: No  NERVOUS SYSTEM SYMPTOMS: No  MENTAL HEALTH SYMPTOMS: No  Back pain: No  Muscle aches: No  Neck pain: No  Swollen joints: No  Joint pain: Yes  Bone pain: No  Muscle cramps: No  Muscle weakness: No  Joint stiffness: Yes  Bone fracture: No

## 2021-01-15 ENCOUNTER — HEALTH MAINTENANCE LETTER (OUTPATIENT)
Age: 56
End: 2021-01-15

## 2021-02-13 ENCOUNTER — HEALTH MAINTENANCE LETTER (OUTPATIENT)
Age: 56
End: 2021-02-13

## 2021-04-30 ENCOUNTER — IMMUNIZATION (OUTPATIENT)
Dept: NURSING | Facility: CLINIC | Age: 56
End: 2021-04-30
Payer: COMMERCIAL

## 2021-04-30 PROCEDURE — 0001A PR COVID VAC PFIZER DIL RECON 30 MCG/0.3 ML IM: CPT

## 2021-04-30 PROCEDURE — 91300 PR COVID VAC PFIZER DIL RECON 30 MCG/0.3 ML IM: CPT

## 2021-05-08 ENCOUNTER — OFFICE VISIT (OUTPATIENT)
Dept: URGENT CARE | Facility: URGENT CARE | Age: 56
End: 2021-05-08
Payer: COMMERCIAL

## 2021-05-08 VITALS
DIASTOLIC BLOOD PRESSURE: 71 MMHG | TEMPERATURE: 98.3 F | WEIGHT: 123 LBS | BODY MASS INDEX: 19.85 KG/M2 | SYSTOLIC BLOOD PRESSURE: 107 MMHG | HEART RATE: 71 BPM | OXYGEN SATURATION: 98 %

## 2021-05-08 DIAGNOSIS — H61.21 IMPACTED CERUMEN OF RIGHT EAR: Primary | ICD-10-CM

## 2021-05-08 PROCEDURE — 99213 OFFICE O/P EST LOW 20 MIN: CPT | Mod: 25 | Performed by: PHYSICIAN ASSISTANT

## 2021-05-08 PROCEDURE — 69209 REMOVE IMPACTED EAR WAX UNI: CPT | Mod: RT | Performed by: PHYSICIAN ASSISTANT

## 2021-05-08 NOTE — PROGRESS NOTES
URGENT CARE VISIT:    SUBJECTIVE:   Denise L Soulier is a 55 year old female presenting with a chief complaint of right  decreased hearing.  Onset was 2 week(s) ago.   She denies the following symptoms: ear pain, fever, chills and stuffy nose  Course of illness is same.    Treatment measures tried include flushing with no relief of symptoms.  Predisposing factors include none.    PMH:   Past Medical History:   Diagnosis Date     Allergies     seasonal spring and fall     GERD (gastroesophageal reflux disease)      Myoclonus Sept, 08     Sinus infection     chronic     Allergies: Penicillins   Medications:   Current Outpatient Medications   Medication Sig Dispense Refill     azelastine (ASTELIN) 0.1 % nasal spray Spray 1 spray into both nostrils daily       CALCIUM + D OR 1 tab daily       HYDROcodone-acetaminophen (NORCO) 5-325 MG tablet Take 1 tablet by mouth every 6 hours as needed for severe pain (Patient not taking: Reported on 5/8/2021) 10 tablet 0     IBUPROFEN PO        MULTIVITAMIN TABS   OR 1 TABLET DAILY       Social History:   Social History     Tobacco Use     Smoking status: Never Smoker     Smokeless tobacco: Never Used   Substance Use Topics     Alcohol use: No       ROS:  Review of systems negative except as stated above.    OBJECTIVE:  /71   Pulse 71   Temp 98.3  F (36.8  C) (Tympanic)   Wt 55.8 kg (123 lb)   LMP 10/23/2015   SpO2 98%   BMI 19.85 kg/m    GENERAL APPEARANCE: healthy, alert and no distress  EYES: conjunctiva clear  HENT: TM's normal.  Copious soft cerumen in right ear.   NECK: supple, nontender, no lymphadenopathy  RESP: lungs clear to auscultation - no rales, rhonchi or wheezes  CV: regular rates and rhythm, normal S1 S2, no murmur noted  SKIN: no suspicious lesions or rashes    ASSESSMENT:    ICD-10-CM    1. Impacted cerumen of right ear  H61.21 LA REMOVAL IMPACTED CERUMEN IRRIGATION/LVG UNILAT       ROS:  Review of systems negative except as stated  above.    OBJECTIVE:  /78   Pulse 80   Temp 98  F (36.7  C) (Tympanic)   Resp 20   Wt 98 kg (216 lb)   SpO2 98%   BMI 31.90 kg/m    GENERAL APPEARANCE: healthy, alert and no distress  EYES: EOMI,  PERRL, conjunctiva clear  HENT: TM's normal bilaterally and cerumen bilateral. Left more than right.   RESP: lungs clear to auscultation - no rales, rhonchi or wheezes  CV: regular rates and rhythm, normal S1 S2, no murmur noted  SKIN: no suspicious lesions or rashes    ASSESSMENT:    ICD-10-CM    1. Impacted cerumen of right ear  H61.21 DE REMOVAL IMPACTED CERUMEN IRRIGATION/LVG UNILAT       PROCEDURE: Ear irrigation  Ear wax was successfully removed with irrigation. Patient tolerated the procedure well.     PLAN:  Patient Instructions   Patient was educated on the natural  course of the condition. Conservative measures to prevent ear wax build up including applying a few drops of mineral oil or earwax softener (Debrox) were discussed.  Avoid using q tips as this may push ear wax further in to the ear canal.  See your primary care provider if symptoms worsen or do not improve in 7 days. Seek emergency care if you develop severe ear pain or fever over 103.     Patient verbalized understanding and is agreeable to plan. The patient was discharged ambulatory and in stable condition.    Keyla Tinsley PA-C on 5/8/2021 at 9:46 AM

## 2021-05-21 ENCOUNTER — IMMUNIZATION (OUTPATIENT)
Dept: NURSING | Facility: CLINIC | Age: 56
End: 2021-05-21
Attending: INTERNAL MEDICINE
Payer: COMMERCIAL

## 2021-05-21 PROCEDURE — 91300 PR COVID VAC PFIZER DIL RECON 30 MCG/0.3 ML IM: CPT

## 2021-05-21 PROCEDURE — 0002A PR COVID VAC PFIZER DIL RECON 30 MCG/0.3 ML IM: CPT

## 2021-07-05 DIAGNOSIS — Z12.31 VISIT FOR SCREENING MAMMOGRAM: ICD-10-CM

## 2021-07-05 PROCEDURE — 77067 SCR MAMMO BI INCL CAD: CPT | Mod: 26 | Performed by: RADIOLOGY

## 2021-07-05 PROCEDURE — 77067 SCR MAMMO BI INCL CAD: CPT

## 2021-09-19 ENCOUNTER — HEALTH MAINTENANCE LETTER (OUTPATIENT)
Age: 56
End: 2021-09-19

## 2021-11-03 ASSESSMENT — ENCOUNTER SYMPTOMS
SORE THROAT: 0
MYALGIAS: 0
ABDOMINAL PAIN: 0
BREAST MASS: 0
FEVER: 0
DIARRHEA: 0
PALPITATIONS: 0
JOINT SWELLING: 0
PARESTHESIAS: 0
HEMATURIA: 0
NERVOUS/ANXIOUS: 0
DYSURIA: 0
EYE PAIN: 0
CHILLS: 0
FREQUENCY: 0
WEAKNESS: 0
HEADACHES: 0
CONSTIPATION: 0
ARTHRALGIAS: 0
HEMATOCHEZIA: 0
DIZZINESS: 0
NAUSEA: 0
HEARTBURN: 0
COUGH: 0
SHORTNESS OF BREATH: 0

## 2021-11-09 ENCOUNTER — OFFICE VISIT (OUTPATIENT)
Dept: FAMILY MEDICINE | Facility: CLINIC | Age: 56
End: 2021-11-09
Payer: COMMERCIAL

## 2021-11-09 VITALS
DIASTOLIC BLOOD PRESSURE: 64 MMHG | OXYGEN SATURATION: 98 % | SYSTOLIC BLOOD PRESSURE: 100 MMHG | HEART RATE: 76 BPM | TEMPERATURE: 97.2 F | RESPIRATION RATE: 16 BRPM | WEIGHT: 121 LBS | BODY MASS INDEX: 19.44 KG/M2 | HEIGHT: 66 IN

## 2021-11-09 DIAGNOSIS — H69.92 DYSFUNCTION OF LEFT EUSTACHIAN TUBE: ICD-10-CM

## 2021-11-09 DIAGNOSIS — Z13.220 LIPID SCREENING: ICD-10-CM

## 2021-11-09 DIAGNOSIS — Z00.00 ROUTINE GENERAL MEDICAL EXAMINATION AT A HEALTH CARE FACILITY: Primary | ICD-10-CM

## 2021-11-09 DIAGNOSIS — Z12.11 SPECIAL SCREENING FOR MALIGNANT NEOPLASMS, COLON: ICD-10-CM

## 2021-11-09 DIAGNOSIS — Z13.1 SCREENING FOR DIABETES MELLITUS: ICD-10-CM

## 2021-11-09 PROCEDURE — 99396 PREV VISIT EST AGE 40-64: CPT | Performed by: NURSE PRACTITIONER

## 2021-11-09 SDOH — ECONOMIC STABILITY: FOOD INSECURITY: WITHIN THE PAST 12 MONTHS, THE FOOD YOU BOUGHT JUST DIDN'T LAST AND YOU DIDN'T HAVE MONEY TO GET MORE.: NEVER TRUE

## 2021-11-09 SDOH — ECONOMIC STABILITY: TRANSPORTATION INSECURITY
IN THE PAST 12 MONTHS, HAS LACK OF TRANSPORTATION KEPT YOU FROM MEETINGS, WORK, OR FROM GETTING THINGS NEEDED FOR DAILY LIVING?: NO

## 2021-11-09 SDOH — ECONOMIC STABILITY: FOOD INSECURITY: WITHIN THE PAST 12 MONTHS, YOU WORRIED THAT YOUR FOOD WOULD RUN OUT BEFORE YOU GOT MONEY TO BUY MORE.: NEVER TRUE

## 2021-11-09 SDOH — ECONOMIC STABILITY: TRANSPORTATION INSECURITY
IN THE PAST 12 MONTHS, HAS THE LACK OF TRANSPORTATION KEPT YOU FROM MEDICAL APPOINTMENTS OR FROM GETTING MEDICATIONS?: NO

## 2021-11-09 SDOH — HEALTH STABILITY: PHYSICAL HEALTH: ON AVERAGE, HOW MANY DAYS PER WEEK DO YOU ENGAGE IN MODERATE TO STRENUOUS EXERCISE (LIKE A BRISK WALK)?: 7 DAYS

## 2021-11-09 SDOH — ECONOMIC STABILITY: INCOME INSECURITY: IN THE LAST 12 MONTHS, WAS THERE A TIME WHEN YOU WERE NOT ABLE TO PAY THE MORTGAGE OR RENT ON TIME?: NO

## 2021-11-09 SDOH — ECONOMIC STABILITY: INCOME INSECURITY: HOW HARD IS IT FOR YOU TO PAY FOR THE VERY BASICS LIKE FOOD, HOUSING, MEDICAL CARE, AND HEATING?: NOT HARD AT ALL

## 2021-11-09 SDOH — HEALTH STABILITY: PHYSICAL HEALTH: ON AVERAGE, HOW MANY MINUTES DO YOU ENGAGE IN EXERCISE AT THIS LEVEL?: 30 MIN

## 2021-11-09 ASSESSMENT — ENCOUNTER SYMPTOMS
JOINT SWELLING: 0
HEMATOCHEZIA: 0
ABDOMINAL PAIN: 0
FREQUENCY: 0
HEARTBURN: 0
CHILLS: 0
NERVOUS/ANXIOUS: 0
ARTHRALGIAS: 0
SORE THROAT: 0
NAUSEA: 0
HEMATURIA: 0
COUGH: 0
DIARRHEA: 0
DIZZINESS: 0
SHORTNESS OF BREATH: 0
CONSTIPATION: 0
WEAKNESS: 0
DYSURIA: 0
EYE PAIN: 0
FEVER: 0
PALPITATIONS: 0
MYALGIAS: 0
BREAST MASS: 0
PARESTHESIAS: 0
HEADACHES: 0

## 2021-11-09 ASSESSMENT — SOCIAL DETERMINANTS OF HEALTH (SDOH)
DO YOU BELONG TO ANY CLUBS OR ORGANIZATIONS SUCH AS CHURCH GROUPS UNIONS, FRATERNAL OR ATHLETIC GROUPS, OR SCHOOL GROUPS?: NO
ARE YOU MARRIED, WIDOWED, DIVORCED, SEPARATED, NEVER MARRIED, OR LIVING WITH A PARTNER?: NEVER MARRIED
HOW OFTEN DO YOU GET TOGETHER WITH FRIENDS OR RELATIVES?: ONCE A WEEK
HOW OFTEN DO YOU ATTEND CHURCH OR RELIGIOUS SERVICES?: NEVER
IN A TYPICAL WEEK, HOW MANY TIMES DO YOU TALK ON THE PHONE WITH FAMILY, FRIENDS, OR NEIGHBORS?: TWICE A WEEK

## 2021-11-09 ASSESSMENT — LIFESTYLE VARIABLES
HOW OFTEN DO YOU HAVE A DRINK CONTAINING ALCOHOL: NEVER
HOW OFTEN DO YOU HAVE SIX OR MORE DRINKS ON ONE OCCASION: NEVER
HOW MANY STANDARD DRINKS CONTAINING ALCOHOL DO YOU HAVE ON A TYPICAL DAY: PATIENT DECLINED

## 2021-11-09 ASSESSMENT — MIFFLIN-ST. JEOR: SCORE: 1169.57

## 2021-11-09 NOTE — PROGRESS NOTES
SUBJECTIVE:   CC: Denise L Soulier is an 56 year old woman who presents for preventive health visit.     {  Healthy Habits:     Getting at least 3 servings of Calcium per day:  Yes    Bi-annual eye exam:  Yes    Dental care twice a year:  Yes    Sleep apnea or symptoms of sleep apnea:  None    Diet:  Low salt, Low fat/cholesterol and Vegetarian/vegan    Frequency of exercise:  4-5 days/week    Duration of exercise:  30-45 minutes    Taking medications regularly:  Yes    Barriers to taking medications:  None    Medication side effects:  Not applicable    PHQ-2 Total Score: 0    Additional concerns today:  No    Today's PHQ-2 Score:   PHQ-2 ( 1999 [employer]) 11/3/2021   Q1: Little interest or pleasure in doing things 0   Q2: Feeling down, depressed or hopeless 0   PHQ-2 Score 0   PHQ-2 Total Score (12-17 Years)- Positive if 3 or more points; Administer PHQ-A if positive 0   Q1: Little interest or pleasure in doing things Not at all   Q2: Feeling down, depressed or hopeless Not at all   PHQ-2 Score 0       Abuse: Current or Past (Physical, Sexual or Emotional) - No  Do you feel safe in your environment? Yes    Have you ever done Advance Care Planning? (For example, a Health Directive, POLST, or a discussion with a medical provider or your loved ones about your wishes): No, advance care planning information given to patient to review.  Patient plans to discuss their wishes with loved ones or provider.      Social History     Tobacco Use     Smoking status: Never Smoker     Smokeless tobacco: Never Used   Substance Use Topics     Alcohol use: No     If you drink alcohol do you typically have >3 drinks per day or >7 drinks per week? No    Alcohol Use 11/9/2021   Prescreen: >3 drinks/day or >7 drinks/week? -   Prescreen: >3 drinks/day or >7 drinks/week? No   No flowsheet data found.    Reviewed orders with patient.  Reviewed health maintenance and updated orders accordingly - Yes  Labs reviewed in EPIC    Breast Cancer  "Screening:    Breast CA Risk Assessment (FHS-7) 11/3/2021   Do you have a family history of breast, colon, or ovarian cancer? No / Unknown       Pertinent mammograms are reviewed under the imaging tab.    History of abnormal Pap smear: YES - updated in Problem List and Health Maintenance accordingly  PAP / HPV Latest Ref Rng & Units 10/28/2019 10/27/2015 3/7/2011   PAP (Historical) - NIL OTHER-NIL, See Result NIL   HPV16 NEG:Negative Negative - -   HPV18 NEG:Negative Negative - -   HRHPV NEG:Negative Negative - -     Reviewed and updated as needed this visit by clinical staff  Tobacco  Allergies  Meds  Problems  Med Hx  Surg Hx  Fam Hx  Soc Hx         Reviewed and updated as needed this visit by Provider  Tobacco  Allergies  Meds  Problems  Med Hx  Surg Hx  Fam Hx          Review of Systems   Constitutional: Negative for chills and fever.   HENT: Positive for congestion. Negative for ear pain, hearing loss and sore throat.    Eyes: Negative for pain and visual disturbance.   Respiratory: Negative for cough and shortness of breath.    Cardiovascular: Negative for chest pain, palpitations and peripheral edema.   Gastrointestinal: Negative for abdominal pain, constipation, diarrhea, heartburn, hematochezia and nausea.   Breasts:  Negative for tenderness, breast mass and discharge.   Genitourinary: Negative for dysuria, frequency, genital sores, hematuria, pelvic pain, urgency, vaginal bleeding and vaginal discharge.   Musculoskeletal: Negative for arthralgias, joint swelling and myalgias.   Skin: Negative for rash.   Neurological: Negative for dizziness, weakness, headaches and paresthesias.   Psychiatric/Behavioral: Negative for mood changes. The patient is not nervous/anxious.         OBJECTIVE:   /64 (BP Location: Right arm, Patient Position: Sitting, Cuff Size: Adult Regular)   Pulse 76   Temp 97.2  F (36.2  C) (Temporal)   Resp 16   Ht 1.683 m (5' 6.25\")   Wt 54.9 kg (121 lb)   LMP " 10/23/2015   SpO2 98%   BMI 19.38 kg/m    Physical Exam  GENERAL APPEARANCE: healthy, alert and no distress  EYES: Eyes grossly normal to inspection, PERRL and conjunctivae and sclerae normal  HENT: ear canals and TM's normal, nose and mouth without ulcers or lesions, oropharynx clear and oral mucous membranes moist  NECK: no adenopathy, no asymmetry, masses, or scars and thyroid normal to palpation  RESP: lungs clear to auscultation - no rales, rhonchi or wheezes  BREAST: normal without masses, tenderness or nipple discharge and no palpable axillary masses or adenopathy  CV: regular rate and rhythm, normal S1 S2, no S3 or S4, no murmur, click or rub, no peripheral edema and peripheral pulses strong  ABDOMEN: soft, nontender, no hepatosplenomegaly, no masses and bowel sounds normal   (female): deferred.    MS: no musculoskeletal defects are noted and gait is age appropriate without ataxia  SKIN: no suspicious lesions or rashes  NEURO: Normal strength and tone, sensory exam grossly normal, mentation intact and speech normal  PSYCH: mentation appears normal and affect normal/bright      ASSESSMENT/PLAN:       ICD-10-CM    1. Routine general medical examination at a health care facility  Z00.00 HIV Antigen Antibody Combo     Hepatitis C antibody   2. Screening for diabetes mellitus  Z13.1 Glucose   3. Lipid screening  Z13.220 Lipid panel reflex to direct LDL Fasting   4. Dysfunction of left eustachian tube  H69.82 carbamide peroxide (DEBROX) 6.5 % otic solution   5. Special screening for malignant neoplasms, colon  Z12.11 Fecal colorectal cancer screen (FIT)      well female,  fasting for labs.  Encouraged to continue exercise and healthy diet choices.      Fit screen pending.        Patient has been advised of split billing requirements and indicates understanding: Yes  COUNSELING:  Reviewed preventive health counseling, as reflected in patient instructions    Estimated body mass index is 19.38 kg/m  as calculated  "from the following:    Height as of this encounter: 1.683 m (5' 6.25\").    Weight as of this encounter: 54.9 kg (121 lb).        She reports that she has never smoked. She has never used smokeless tobacco.      Counseling Resources:  ATP IV Guidelines  Pooled Cohorts Equation Calculator  Breast Cancer Risk Calculator  BRCA-Related Cancer Risk Assessment: FHS-7 Tool  FRAX Risk Assessment  ICSI Preventive Guidelines  Dietary Guidelines for Americans, 2010  USDA's MyPlate  ASA Prophylaxis  Lung CA Screening    CALLIE Granados CNP  [facility]  "

## 2022-07-25 ENCOUNTER — ANCILLARY PROCEDURE (OUTPATIENT)
Dept: MAMMOGRAPHY | Facility: CLINIC | Age: 57
End: 2022-07-25
Payer: COMMERCIAL

## 2022-07-25 DIAGNOSIS — Z12.31 VISIT FOR SCREENING MAMMOGRAM: ICD-10-CM

## 2022-07-25 PROCEDURE — 77067 SCR MAMMO BI INCL CAD: CPT | Mod: 26

## 2022-07-25 PROCEDURE — 77067 SCR MAMMO BI INCL CAD: CPT

## 2022-07-28 ENCOUNTER — LAB (OUTPATIENT)
Dept: LAB | Facility: CLINIC | Age: 57
End: 2022-07-28
Payer: COMMERCIAL

## 2022-07-28 DIAGNOSIS — Z00.00 ROUTINE GENERAL MEDICAL EXAMINATION AT A HEALTH CARE FACILITY: ICD-10-CM

## 2022-07-28 DIAGNOSIS — Z13.1 SCREENING FOR DIABETES MELLITUS: ICD-10-CM

## 2022-07-28 DIAGNOSIS — Z13.220 LIPID SCREENING: ICD-10-CM

## 2022-07-28 LAB
HCV AB SERPL QL IA: NONREACTIVE
HIV 1+2 AB+HIV1 P24 AG SERPL QL IA: NONREACTIVE

## 2022-07-28 PROCEDURE — 86803 HEPATITIS C AB TEST: CPT

## 2022-07-28 PROCEDURE — 87389 HIV-1 AG W/HIV-1&-2 AB AG IA: CPT

## 2022-07-28 PROCEDURE — 36415 COLL VENOUS BLD VENIPUNCTURE: CPT

## 2022-07-29 ENCOUNTER — LAB (OUTPATIENT)
Dept: LAB | Facility: CLINIC | Age: 57
End: 2022-07-29
Payer: COMMERCIAL

## 2022-07-29 DIAGNOSIS — Z12.11 SPECIAL SCREENING FOR MALIGNANT NEOPLASMS, COLON: ICD-10-CM

## 2022-07-29 PROCEDURE — 82274 ASSAY TEST FOR BLOOD FECAL: CPT

## 2022-08-01 LAB — HEMOCCULT STL QL IA: NEGATIVE

## 2022-11-20 ENCOUNTER — HEALTH MAINTENANCE LETTER (OUTPATIENT)
Age: 57
End: 2022-11-20

## 2022-12-24 ENCOUNTER — HEALTH MAINTENANCE LETTER (OUTPATIENT)
Age: 57
End: 2022-12-24

## 2023-09-12 ASSESSMENT — ENCOUNTER SYMPTOMS
SHORTNESS OF BREATH: 0
JOINT SWELLING: 0
HEADACHES: 0
HEMATURIA: 0
BREAST MASS: 0
ARTHRALGIAS: 0
COUGH: 0
PALPITATIONS: 0
CONSTIPATION: 0
ABDOMINAL PAIN: 0
NAUSEA: 0
NERVOUS/ANXIOUS: 0
HEMATOCHEZIA: 0
DIZZINESS: 0
DIARRHEA: 0
MYALGIAS: 0
EYE PAIN: 0
CHILLS: 0
FEVER: 0
HEARTBURN: 0
SORE THROAT: 0
FREQUENCY: 0
WEAKNESS: 0
PARESTHESIAS: 0
DYSURIA: 0

## 2023-09-18 ENCOUNTER — OFFICE VISIT (OUTPATIENT)
Dept: FAMILY MEDICINE | Facility: CLINIC | Age: 58
End: 2023-09-18
Payer: COMMERCIAL

## 2023-09-18 ENCOUNTER — TRANSFERRED RECORDS (OUTPATIENT)
Dept: FAMILY MEDICINE | Facility: CLINIC | Age: 58
End: 2023-09-18

## 2023-09-18 VITALS
HEART RATE: 71 BPM | SYSTOLIC BLOOD PRESSURE: 123 MMHG | TEMPERATURE: 97.8 F | BODY MASS INDEX: 18.52 KG/M2 | OXYGEN SATURATION: 98 % | HEIGHT: 67 IN | DIASTOLIC BLOOD PRESSURE: 77 MMHG | WEIGHT: 118 LBS | RESPIRATION RATE: 16 BRPM

## 2023-09-18 DIAGNOSIS — Z00.00 ROUTINE GENERAL MEDICAL EXAMINATION AT A HEALTH CARE FACILITY: Primary | ICD-10-CM

## 2023-09-18 DIAGNOSIS — K21.9 GASTROESOPHAGEAL REFLUX DISEASE, UNSPECIFIED WHETHER ESOPHAGITIS PRESENT: ICD-10-CM

## 2023-09-18 DIAGNOSIS — Z12.31 ENCOUNTER FOR SCREENING MAMMOGRAM FOR BREAST CANCER: ICD-10-CM

## 2023-09-18 DIAGNOSIS — Z76.89 ENCOUNTER TO ESTABLISH CARE WITH NEW DOCTOR: ICD-10-CM

## 2023-09-18 DIAGNOSIS — Z12.11 SCREEN FOR COLON CANCER: ICD-10-CM

## 2023-09-18 DIAGNOSIS — Z78.0 ASYMPTOMATIC POSTMENOPAUSAL STATUS: ICD-10-CM

## 2023-09-18 DIAGNOSIS — R25.2 BILATERAL LEG CRAMPS: ICD-10-CM

## 2023-09-18 LAB
ANION GAP SERPL CALCULATED.3IONS-SCNC: 10 MMOL/L (ref 7–15)
BUN SERPL-MCNC: 9.2 MG/DL (ref 6–20)
CALCIUM SERPL-MCNC: 9.6 MG/DL (ref 8.6–10)
CHLORIDE SERPL-SCNC: 102 MMOL/L (ref 98–107)
CREAT SERPL-MCNC: 0.72 MG/DL (ref 0.51–0.95)
DEPRECATED HCO3 PLAS-SCNC: 27 MMOL/L (ref 22–29)
EGFRCR SERPLBLD CKD-EPI 2021: >90 ML/MIN/1.73M2
FERRITIN SERPL-MCNC: 73 NG/ML (ref 11–328)
GLUCOSE SERPL-MCNC: 89 MG/DL (ref 70–99)
MAGNESIUM SERPL-MCNC: 2.2 MG/DL (ref 1.7–2.3)
POTASSIUM SERPL-SCNC: 4.2 MMOL/L (ref 3.4–5.3)
SODIUM SERPL-SCNC: 139 MMOL/L (ref 136–145)
TSH SERPL DL<=0.005 MIU/L-ACNC: 1.76 UIU/ML (ref 0.3–4.2)

## 2023-09-18 PROCEDURE — 99214 OFFICE O/P EST MOD 30 MIN: CPT | Mod: 25 | Performed by: NURSE PRACTITIONER

## 2023-09-18 PROCEDURE — 90471 IMMUNIZATION ADMIN: CPT | Performed by: NURSE PRACTITIONER

## 2023-09-18 PROCEDURE — 36415 COLL VENOUS BLD VENIPUNCTURE: CPT | Performed by: NURSE PRACTITIONER

## 2023-09-18 PROCEDURE — 99396 PREV VISIT EST AGE 40-64: CPT | Mod: 25 | Performed by: NURSE PRACTITIONER

## 2023-09-18 PROCEDURE — 83735 ASSAY OF MAGNESIUM: CPT | Performed by: NURSE PRACTITIONER

## 2023-09-18 PROCEDURE — 80048 BASIC METABOLIC PNL TOTAL CA: CPT | Performed by: NURSE PRACTITIONER

## 2023-09-18 PROCEDURE — 90686 IIV4 VACC NO PRSV 0.5 ML IM: CPT | Performed by: NURSE PRACTITIONER

## 2023-09-18 PROCEDURE — 84443 ASSAY THYROID STIM HORMONE: CPT | Performed by: NURSE PRACTITIONER

## 2023-09-18 PROCEDURE — 82728 ASSAY OF FERRITIN: CPT | Performed by: NURSE PRACTITIONER

## 2023-09-18 RX ORDER — FAMOTIDINE 20 MG/1
20 TABLET, FILM COATED ORAL 2 TIMES DAILY PRN
Qty: 30 TABLET | Refills: 1 | Status: SHIPPED | OUTPATIENT
Start: 2023-09-18 | End: 2023-12-11

## 2023-09-18 ASSESSMENT — ENCOUNTER SYMPTOMS
NAUSEA: 0
JOINT SWELLING: 0
DIZZINESS: 0
ABDOMINAL PAIN: 0
FEVER: 0
EYE PAIN: 0
CONSTIPATION: 0
HEADACHES: 0
MYALGIAS: 0
FREQUENCY: 0
PALPITATIONS: 0
SORE THROAT: 0
CHILLS: 0
WEAKNESS: 0
NERVOUS/ANXIOUS: 0
COUGH: 0
DYSURIA: 0
DIARRHEA: 0
BREAST MASS: 0
ARTHRALGIAS: 0
SHORTNESS OF BREATH: 0
HEMATURIA: 0
HEMATOCHEZIA: 0
HEARTBURN: 0
PARESTHESIAS: 0

## 2023-09-18 NOTE — PROGRESS NOTES
SUBJECTIVE:   CC: Eleanor is an 58 year old who presents for preventive health visit.       9/18/2023    10:46 AM   Additional Questions   Roomed by Shiv   Accompanied by Self       Healthy Habits:     Getting at least 3 servings of Calcium per day:  NO    Bi-annual eye exam:  Yes    Dental care twice a year:  Yes    Sleep apnea or symptoms of sleep apnea:  None    Diet:  Vegetarian/vegan    Frequency of exercise:  4-5 days/week    Duration of exercise:  30-45 minutes    Taking medications regularly:  Yes    Medication side effects:  None      Today's PHQ-2 Score:       9/18/2023    10:08 AM   PHQ-2 ( 1999 [employer])   Q1: Little interest or pleasure in doing things 0   Q2: Feeling down, depressed or hopeless 0   PHQ-2 Score 0   Q1: Little interest or pleasure in doing things Not at all   Q2: Feeling down, depressed or hopeless Not at all   PHQ-2 Score 0       58 year old year old female  with PMH   Patient Active Problem List   Diagnosis Code    GERD (gastroesophageal reflux disease) K21.9    Myoclonus G25.3    CARDIOVASCULAR SCREENING; LDL GOAL LESS THAN 160 Z13.6    Nocturnal myoclonus G47.69    Insomnia G47.00    Atopic rhinitis J30.9    Muscle aches M79.10    Trigger thumb of right hand M65.311    in clinic for preventive health care exam.     In addition to the preventive visit, 30  minutes of the appointment were spent evaluating and developing a treatment plan for her additional concern(s).          Additional  Problems   - burping at night; no cough, throat clearing; reflux w.certain foods heide type;   - BLE cramping at night; initially started at age 42     Menopause: last period 2016 BMI 18          Social History     Tobacco Use    Smoking status: Never    Smokeless tobacco: Never   Substance Use Topics    Alcohol use: No             9/12/2023     3:42 PM   Alcohol Use   Prescreen: >3 drinks/day or >7 drinks/week? Not Applicable     Reviewed orders with patient.  Reviewed health maintenance and updated  orders accordingly - Yes  Lab work is in process  Labs reviewed in EPIC  BP Readings from Last 3 Encounters:   23 123/77   21 100/64   21 107/71    Wt Readings from Last 3 Encounters:   23 53.5 kg (118 lb)   21 54.9 kg (121 lb)   21 55.8 kg (123 lb)                    Breast Cancer Screenin/3/2021    11:41 AM   Breast CA Risk Assessment (FHS-7)   Do you have a family history of breast, colon, or ovarian cancer? No / Unknown         Mammogram Screening: Recommended mammography every 1-2 years with patient discussion and risk factor consideration  Pertinent mammograms are reviewed under the imaging tab.    History of abnormal Pap smear: NO - age 30-65 PAP every 5 years with negative HPV co-testing recommended      Latest Ref Rng & Units 10/28/2019    10:00 AM 10/28/2019     9:34 AM 10/27/2015    12:00 AM   PAP / HPV   PAP (Historical)   NIL  OTHER-NIL, See Result    HPV 16 DNA NEG^Negative Negative      HPV 18 DNA NEG^Negative Negative      Other HR HPV NEG^Negative Negative        Reviewed and updated as needed this visit by clinical staff   Tobacco  Allergies  Meds  Problems  Med Hx  Surg Hx  Fam Hx          Reviewed and updated as needed this visit by Provider   Tobacco  Allergies  Meds  Problems  Med Hx  Surg Hx  Fam Hx         Past Medical History:   Diagnosis Date    Allergies     seasonal spring and fall    GERD (gastroesophageal reflux disease)     Myoclonus     Sinus infection     chronic        Review of Systems   Constitutional:  Negative for chills and fever.   HENT:  Negative for congestion, ear pain, hearing loss and sore throat.    Eyes:  Negative for pain and visual disturbance.   Respiratory:  Negative for cough and shortness of breath.    Cardiovascular:  Negative for chest pain, palpitations and peripheral edema.   Gastrointestinal:  Negative for abdominal pain, constipation, diarrhea, heartburn, hematochezia and nausea.  "  Breasts:  Negative for tenderness, breast mass and discharge.   Genitourinary:  Negative for dysuria, frequency, genital sores, hematuria, pelvic pain, urgency, vaginal bleeding and vaginal discharge.   Musculoskeletal:  Negative for arthralgias, joint swelling and myalgias.   Skin:  Negative for rash.   Neurological:  Negative for dizziness, weakness, headaches and paresthesias.   Psychiatric/Behavioral:  Negative for mood changes. The patient is not nervous/anxious.           OBJECTIVE:   /77 (BP Location: Right arm, Patient Position: Sitting, Cuff Size: Adult Small)   Pulse 71   Temp 97.8  F (36.6  C) (Temporal)   Resp 16   Ht 1.7 m (5' 6.93\")   Wt 53.5 kg (118 lb)   LMP 10/23/2015   SpO2 98%   BMI 18.52 kg/m    Physical Exam  GENERAL APPEARANCE: healthy, alert and no distress  EYES: Eyes grossly normal to inspection, PERRL and conjunctivae and sclerae normal  HENT: ear canals and TM's normal, nose and mouth without ulcers or lesions, oropharynx clear and oral mucous membranes moist  NECK: no adenopathy, no asymmetry, masses, or scars and thyroid normal to palpation  RESP: lungs clear to auscultation - no rales, rhonchi or wheezes  BREAST: normal without masses, tenderness or nipple discharge and no palpable axillary masses or adenopathy  CV: regular rate and rhythm, normal S1 S2, no S3 or S4, no murmur, click or rub, no peripheral edema and peripheral pulses strong  ABDOMEN: soft, nontender, no hepatosplenomegaly, no masses and bowel sounds normal  MS: no musculoskeletal defects are noted and gait is age appropriate without ataxia  SKIN: no suspicious lesions or rashes  NEURO: Normal strength and tone, sensory exam grossly normal, mentation intact and speech normal  PSYCH: mentation appears normal and affect normal/bright    Diagnostic Test Results:  Labs reviewed in Epic    ASSESSMENT/PLAN:   Eleanor was seen today for physical.    Diagnoses and all orders for this visit:    Routine general " medical examination at a health care facility  Preventative exam w/no abnormalities and/or concerns listed in diagnoses; discussed health maintenance screenings including prostate, breast, cervical and colorectal ca screenings related to gender;  reviewed and reconciled medication, medical history and patient related health concerns  Plan: obtain metabolic labs    -     REVIEW OF HEALTH MAINTENANCE PROTOCOL ORDERS  -     INFLUENZA VACCINE >6 MONTHS (AFLURIA/FLUZONE)    Encounter for screening mammogram for breast cancer  -     *MA Screening Digital Bilateral; Future    Screen for colon cancer  Discussed options including FIT, Cologuard, colonoscopy  -     COLOGUARD(EXACT SCIENCES)  -     COLOGUARD(EXACT SCIENCES)    Encounter to establish care with new doctor  Reviewed chronic health conditions; medications, labs and pertinent health concerns today    Asymptomatic postmenopausal status  -     DX Hip/Pelvis/Spine; Future    Gastroesophageal reflux disease, unspecified whether esophagitis present  -     famotidine (PEPCID) 20 MG tablet; Take 1 tablet (20 mg) by mouth 2 times daily as needed (heartburn)    Bilateral leg cramps  -     Basic metabolic panel  (Ca, Cl, CO2, Creat, Gluc, K, Na, BUN); Future  -     TSH with free T4 reflex; Future  -     Magnesium; Future  -     Ferritin; Future  -     Basic metabolic panel  (Ca, Cl, CO2, Creat, Gluc, K, Na, BUN)  -     TSH with free T4 reflex  -     Magnesium  -     Ferritin        Patient has been advised of split billing requirements and indicates understanding: Yes      COUNSELING:  Reviewed preventive health counseling, as reflected in patient instructions       Regular exercise       Healthy diet/nutrition       Vision screening       Hearing screening       Osteoporosis prevention/bone health       Colorectal Cancer Screening        She reports that she has never smoked. She has never used smokeless tobacco.          CALLIE Llanos [facility]  [place]

## 2023-09-27 NOTE — RESULT ENCOUNTER NOTE
Hi Eleanor,    Your recent results are normal. Any results slightly above or below the normal range have been evaluated as clinically stable.     Let me know if you have any questions or concerns.    Sincerely,  CALLIE Llanos CNP

## 2023-10-03 ENCOUNTER — TELEPHONE (OUTPATIENT)
Dept: FAMILY MEDICINE | Facility: CLINIC | Age: 58
End: 2023-10-03
Payer: COMMERCIAL

## 2023-10-03 DIAGNOSIS — K21.9 GASTROESOPHAGEAL REFLUX DISEASE, UNSPECIFIED WHETHER ESOPHAGITIS PRESENT: Primary | ICD-10-CM

## 2023-10-03 NOTE — CONFIDENTIAL NOTE
PATIENT REQUESTING A CALL BACK REGARDING MED: FAMOTIDINE 20MG. PATIENT STATES MED IS NOT WORKING FOR HER.         PLEASE CALL : [phone]

## 2023-10-04 NOTE — TELEPHONE ENCOUNTER
Tru Wallace --    Please review and advise: GERD/PPI  S-(situation): patient states that famotidine is not working for symptoms. Patient having more of a reflex with something in her throat, nausea in the morning, sour taste in mouth, throat clearing.     B-(background): Patient was seen 9/18/2023 for yearly exam. Issue was discussed at that time and agreed to first try the famotidine.     A-(assessment): Patient would like to try another medication that was suggested at last appointment. Do not see a note.     Nan Wakefield, GÓMEZN RN  [facility]

## 2023-10-07 ENCOUNTER — MYC MEDICAL ADVICE (OUTPATIENT)
Dept: FAMILY MEDICINE | Facility: CLINIC | Age: 58
End: 2023-10-07
Payer: COMMERCIAL

## 2023-10-11 LAB — NONINV COLON CA DNA+OCC BLD SCRN STL QL: NORMAL

## 2023-10-17 NOTE — RESULT ENCOUNTER NOTE
Hi Eleanor,    Your colon cancer screening is normal. Plan follow up/repeat test in 3 years.    Let me know if you have any questions or concerns.    Sincerely,  CALLIE Llanos CNP

## 2023-10-26 LAB — NONINV COLON CA DNA+OCC BLD SCRN STL QL: NEGATIVE

## 2023-11-17 ENCOUNTER — OFFICE VISIT (OUTPATIENT)
Dept: URGENT CARE | Facility: URGENT CARE | Age: 58
End: 2023-11-17
Payer: COMMERCIAL

## 2023-11-17 VITALS
DIASTOLIC BLOOD PRESSURE: 82 MMHG | HEIGHT: 67 IN | TEMPERATURE: 97.9 F | HEART RATE: 80 BPM | OXYGEN SATURATION: 97 % | SYSTOLIC BLOOD PRESSURE: 142 MMHG | BODY MASS INDEX: 18.52 KG/M2 | WEIGHT: 118 LBS

## 2023-11-17 DIAGNOSIS — K21.9 GASTROESOPHAGEAL REFLUX DISEASE, UNSPECIFIED WHETHER ESOPHAGITIS PRESENT: Primary | ICD-10-CM

## 2023-11-17 PROCEDURE — 99214 OFFICE O/P EST MOD 30 MIN: CPT | Performed by: PHYSICIAN ASSISTANT

## 2023-11-17 ASSESSMENT — ENCOUNTER SYMPTOMS
ABDOMINAL DISTENTION: 1
VOMITING: 0
CONSTIPATION: 0
DIARRHEA: 0
HEARTBURN: 1
NAUSEA: 1
FEVER: 0
CHILLS: 0

## 2023-11-17 NOTE — PATIENT INSTRUCTIONS
PPIs should be administered 30 to 60 minutes before breakfast for maximal inhibition of proton pumps   I suggest elimination of dietary triggers (caffeine, chocolate, spicy foods, food with high fat content, carbonated beverages, and peppermint)  Take the PPI daily rather than on demand PPIs  because continuous therapy provided better symptom control   Follow-up with GI for further evaluation

## 2023-11-17 NOTE — PROGRESS NOTES
Assessment & Plan          1. Gastroesophageal reflux disease, unspecified whether esophagitis present    -Patient will start Protonix since this helped 15 years ago. She will follow up with GI for further evaluation and more work up.  - Adult GI  Referral - Consult Only; Future  - pantoprazole (PROTONIX) 2 mg/mL SUSP suspension; Take 20 mLs (40 mg) by mouth every morning (before breakfast) for 30 days  Dispense: 600 mL; Refill: 0    Patient Instructions   PPIs should be administered 30 to 60 minutes before breakfast for maximal inhibition of proton pumps   I suggest elimination of dietary triggers (caffeine, chocolate, spicy foods, food with high fat content, carbonated beverages, and peppermint)  Take the PPI daily rather than on demand PPIs  because continuous therapy provided better symptom control   Follow-up with GI for further evaluation    Return for Follow up with GI, Follow up.    At the end of the encounter, I discussed results, diagnosis, medications. Discussed red flags for immediate return to clinic/ER, as well as indications for follow up if no improvement. Patient understood and agreed to plan. Patient was stable for discharge.    Gabriel Webster is a 58 year old female who presents to clinic today  for the following health issues:  Chief Complaint   Patient presents with    Urgent Care    Gastrophageal Reflux     Pt in clinic to have eval for esophageal burning and excessive burping.     HPI  Patient reports acid reflux symptoms for a few months. She reports mid chest pain, heartburn, excessive burping, and sore throat. She is unsure of how long the symptoms have been. She notes GERD 15 years ago. She was seen by GI specialist. She was on pantoprazole which helped. She saw her PCP 6 weeks ago for the same symptoms. She has been taking omeprazole twice a day for 6 weeks. She notes symptoms have not improved much. She notes she eats healthy diet, avoids spicy foods and caffeine. She has  "hard time swallowing pills, prefers capsules so she can remove the contents and mix with food or liquid medication. She denies fever and chills.       Review of Systems   Constitutional:  Negative for chills and fever.   Cardiovascular:  Positive for chest pain.   Gastrointestinal:  Positive for abdominal distention, heartburn and nausea. Negative for constipation, diarrhea and vomiting.       Problem List:  2019-11: Trigger thumb of right hand  2011-09: Upper back pain  2011-09: Neck pain  2011-08: Insomnia  2011-08: Atopic rhinitis  2011-08: Muscle aches  2011-07: DELBERT (obstructive sleep apnea)  2011-03: Nocturnal myoclonus  2010-05: CARDIOVASCULAR SCREENING; LDL GOAL LESS THAN 160  2010-01: GERD (gastroesophageal reflux disease)  2010-01: Myoclonus      Past Medical History:   Diagnosis Date    Allergies     seasonal spring and fall    GERD (gastroesophageal reflux disease)     Myoclonus Sept, 08    Sinus infection     chronic       Social History     Tobacco Use    Smoking status: Never    Smokeless tobacco: Never   Substance Use Topics    Alcohol use: No           Objective    BP (!) 142/82   Pulse 80   Temp 97.9  F (36.6  C) (Temporal)   Ht 1.695 m (5' 6.75\")   Wt 53.5 kg (118 lb)   LMP 10/23/2015   SpO2 97%   BMI 18.62 kg/m    Physical Exam  Vitals and nursing note reviewed.   Cardiovascular:      Rate and Rhythm: Normal rate and regular rhythm.   Pulmonary:      Effort: Pulmonary effort is normal.      Breath sounds: Normal breath sounds.   Abdominal:      General: Abdomen is flat.      Palpations: Abdomen is soft.      Tenderness: There is no abdominal tenderness. There is no right CVA tenderness or left CVA tenderness.   Lymphadenopathy:      Cervical: No cervical adenopathy.   Skin:     General: Skin is warm and dry.      Findings: No rash.   Neurological:      General: No focal deficit present.      Mental Status: She is alert and oriented to person, place, and time.   Psychiatric:         Mood and " Affect: Mood normal.         Behavior: Behavior normal.              Danni Coronado PA-C

## 2023-11-18 ENCOUNTER — HOSPITAL ENCOUNTER (EMERGENCY)
Facility: CLINIC | Age: 58
Discharge: HOME | End: 2023-11-18
Attending: EMERGENCY MEDICINE | Admitting: EMERGENCY MEDICINE
Payer: COMMERCIAL

## 2023-11-18 VITALS
RESPIRATION RATE: 16 BRPM | BODY MASS INDEX: 18.52 KG/M2 | WEIGHT: 118 LBS | OXYGEN SATURATION: 97 % | HEART RATE: 78 BPM | DIASTOLIC BLOOD PRESSURE: 84 MMHG | TEMPERATURE: 97.8 F | SYSTOLIC BLOOD PRESSURE: 132 MMHG | HEIGHT: 67 IN

## 2023-11-18 DIAGNOSIS — K21.9 GASTROESOPHAGEAL REFLUX DISEASE, UNSPECIFIED WHETHER ESOPHAGITIS PRESENT: ICD-10-CM

## 2023-11-18 LAB
ALBUMIN SERPL BCG-MCNC: 4.5 G/DL (ref 3.5–5.2)
ALP SERPL-CCNC: 66 U/L (ref 40–150)
ALT SERPL W P-5'-P-CCNC: 16 U/L (ref 0–50)
ANION GAP SERPL CALCULATED.3IONS-SCNC: 10 MMOL/L (ref 7–15)
AST SERPL W P-5'-P-CCNC: 29 U/L (ref 0–45)
ATRIAL RATE - MUSE: 60 BPM
BASOPHILS # BLD AUTO: 0 10E3/UL (ref 0–0.2)
BASOPHILS NFR BLD AUTO: 1 %
BILIRUB SERPL-MCNC: 1 MG/DL
BUN SERPL-MCNC: 9.7 MG/DL (ref 6–20)
CALCIUM SERPL-MCNC: 9.5 MG/DL (ref 8.6–10)
CHLORIDE SERPL-SCNC: 99 MMOL/L (ref 98–107)
CREAT SERPL-MCNC: 0.74 MG/DL (ref 0.51–0.95)
DEPRECATED HCO3 PLAS-SCNC: 28 MMOL/L (ref 22–29)
DIASTOLIC BLOOD PRESSURE - MUSE: NORMAL MMHG
EGFRCR SERPLBLD CKD-EPI 2021: >90 ML/MIN/1.73M2
EOSINOPHIL # BLD AUTO: 0.1 10E3/UL (ref 0–0.7)
EOSINOPHIL NFR BLD AUTO: 2 %
ERYTHROCYTE [DISTWIDTH] IN BLOOD BY AUTOMATED COUNT: 12.2 % (ref 10–15)
GLUCOSE SERPL-MCNC: 100 MG/DL (ref 70–99)
HCT VFR BLD AUTO: 42.1 % (ref 35–47)
HGB BLD-MCNC: 13.7 G/DL (ref 11.7–15.7)
IMM GRANULOCYTES # BLD: 0 10E3/UL
IMM GRANULOCYTES NFR BLD: 0 %
INTERPRETATION ECG - MUSE: NORMAL
LIPASE SERPL-CCNC: 32 U/L (ref 13–60)
LYMPHOCYTES # BLD AUTO: 1.3 10E3/UL (ref 0.8–5.3)
LYMPHOCYTES NFR BLD AUTO: 24 %
MCH RBC QN AUTO: 30.6 PG (ref 26.5–33)
MCHC RBC AUTO-ENTMCNC: 32.5 G/DL (ref 31.5–36.5)
MCV RBC AUTO: 94 FL (ref 78–100)
MONOCYTES # BLD AUTO: 0.4 10E3/UL (ref 0–1.3)
MONOCYTES NFR BLD AUTO: 7 %
NEUTROPHILS # BLD AUTO: 3.5 10E3/UL (ref 1.6–8.3)
NEUTROPHILS NFR BLD AUTO: 66 %
NRBC # BLD AUTO: 0 10E3/UL
NRBC BLD AUTO-RTO: 0 /100
P AXIS - MUSE: 79 DEGREES
PLATELET # BLD AUTO: 405 10E3/UL (ref 150–450)
POTASSIUM SERPL-SCNC: 3.7 MMOL/L (ref 3.4–5.3)
PR INTERVAL - MUSE: 178 MS
PROT SERPL-MCNC: 7.2 G/DL (ref 6.4–8.3)
QRS DURATION - MUSE: 94 MS
QT - MUSE: 384 MS
QTC - MUSE: 384 MS
R AXIS - MUSE: -15 DEGREES
RBC # BLD AUTO: 4.47 10E6/UL (ref 3.8–5.2)
SODIUM SERPL-SCNC: 137 MMOL/L (ref 135–145)
SYSTOLIC BLOOD PRESSURE - MUSE: NORMAL MMHG
T AXIS - MUSE: 77 DEGREES
TROPONIN T SERPL HS-MCNC: 6 NG/L
VENTRICULAR RATE- MUSE: 60 BPM
WBC # BLD AUTO: 5.3 10E3/UL (ref 4–11)

## 2023-11-18 PROCEDURE — 84484 ASSAY OF TROPONIN QUANT: CPT | Performed by: EMERGENCY MEDICINE

## 2023-11-18 PROCEDURE — 83690 ASSAY OF LIPASE: CPT | Performed by: EMERGENCY MEDICINE

## 2023-11-18 PROCEDURE — 80053 COMPREHEN METABOLIC PANEL: CPT | Performed by: EMERGENCY MEDICINE

## 2023-11-18 PROCEDURE — 93005 ELECTROCARDIOGRAM TRACING: CPT

## 2023-11-18 PROCEDURE — 85025 COMPLETE CBC W/AUTO DIFF WBC: CPT | Performed by: EMERGENCY MEDICINE

## 2023-11-18 PROCEDURE — 99284 EMERGENCY DEPT VISIT MOD MDM: CPT

## 2023-11-18 PROCEDURE — 250N000013 HC RX MED GY IP 250 OP 250 PS 637: Performed by: EMERGENCY MEDICINE

## 2023-11-18 PROCEDURE — 36415 COLL VENOUS BLD VENIPUNCTURE: CPT | Performed by: EMERGENCY MEDICINE

## 2023-11-18 RX ORDER — MAGNESIUM HYDROXIDE/ALUMINUM HYDROXICE/SIMETHICONE 120; 1200; 1200 MG/30ML; MG/30ML; MG/30ML
30 SUSPENSION ORAL ONCE
Status: COMPLETED | OUTPATIENT
Start: 2023-11-18 | End: 2023-11-18

## 2023-11-18 RX ADMIN — ALUMINUM HYDROXIDE, MAGNESIUM HYDROXIDE, AND SIMETHICONE 30 ML: 200; 200; 20 SUSPENSION ORAL at 10:27

## 2023-11-18 ASSESSMENT — ACTIVITIES OF DAILY LIVING (ADL): ADLS_ACUITY_SCORE: 35

## 2023-11-18 NOTE — ED TRIAGE NOTES
Pt reports went to  yesterday d/t ongoing reflux issues.  Was given protonix but hasn't started it yet.  BP at  was in the 140s and patient concerned as it's not that high.  Pt states started having a headache this AM.       Triage Assessment (Adult)       Row Name 11/18/23 0962          Triage Assessment    Airway WDL WDL        Respiratory WDL    Respiratory WDL WDL        Skin Circulation/Temperature WDL    Skin Circulation/Temperature WDL WDL        Cardiac WDL    Cardiac WDL WDL        Peripheral/Neurovascular WDL    Peripheral Neurovascular WDL WDL        Cognitive/Neuro/Behavioral WDL    Cognitive/Neuro/Behavioral WDL WDL

## 2023-11-18 NOTE — DISCHARGE INSTRUCTIONS
Take Your pantoprazole as directed  Follow-up with gastroenterology.  I have put you in for a consult, they will call you next week.  I will put you in for an upper endoscopy.

## 2023-11-18 NOTE — ED PROVIDER NOTES
History     Chief Complaint:  Headache       HPI   Denise L Soulier is a 58 year old female who presents to the ED with a several month history of epigastric discomfort.  Patient has a history of gastroesophageal reflux disease, and underwent EGD over 10 years ago to confirm this.  She has been quite stable now for about the last decade.  Over the last several months, starting in September, the patient started having epigastric distress with burning radiating into the chest up to the neck.  She started taking famotidine 20 mg daily without improvement.  About a month later in October the patient started on omeprazole 20 mg twice a day.  That has not extinguished her symptoms.  The patient then went to urgent care this week and it was recommended that she be switched to pantoprazole 40 mg daily and that she follow-up with GI for EGD.  Gastroenterology is supposed to be calling her this week.  The patient came in because she has had 4 to 5 pounds of weight loss over the last few months and her symptoms of epigastric distress and chest burning are continuing.  She came in for additional consultation and and and advice.  There is no vomiting.  No hematemesis or hematochezia.  No melena.      Independent Historian:   None    Review of External Notes:      Allergies:  Pcn [Penicillins]     Listed Medications:    Pepcid  Prilosec  Protonix    Past Medical and Surgical History:    GERD  Insomnia  Sinus infection  [place] teeth extraction  Right trigger finger thumb release     Family History:    family history includes Alcohol/Drug in her father; Asthma in her mother; Bronchitis in her mother; Chronic Obstructive Pulmonary Disease in her mother; Depression in her sister; Diabetes in her paternal grandmother; Lipids in her mother; Rheumatoid Arthritis in her mother; Substance Abuse in her brother.    Social History:   reports that she has never smoked. She has never used smokeless tobacco. She reports that she does not  "drink alcohol and does not use drugs.      Physical Exam   Patient Vitals for the past 24 hrs:   BP Temp Temp src Pulse Resp SpO2 Height Weight   11/18/23 0917 (!) 141/76 97.8  F (36.6  C) Temporal 88 16 98 % 1.702 m (5' 7\") 53.5 kg (118 lb)   11:50 AM  Blood pressure at this time is 117/80    General: Resting comfortably on the gurney at this time.  Head:  The scalp, face, and head appear normal  Eyes:  The pupils are equal, round, and reactive to light    Extraocular muscles are intact    Conjunctivae and sclerae are normal  ENT:    The nose is normal    Pinnae are normal    The oropharynx is normal  Neck:  Normal range of motion  CV:  Regular rate  Normal underlying rhythm     No pathological murmur detected  Resp:  Lungs are clear    There is no tachypnea    Non-labored    No rales    No wheezing   GI:  Abdomen is soft, there is no rigidity    She relates her discomfort subjectively to the epigastric location.    There is no tenderness at McBurney's point.    Lower abdominal quadrants are benign.    No distension/tympani    No rebound tenderness     Non-surgical without peritoneal features at this time  MS:  Normal muscular tone    Symmetric motor strength    No major joint effusions  Skin:  No rash or acute skin lesions noted  Neuro:  Normal and fluent speech    No motor deficits  Psych: Awake    Alert    Normal affect    Emergency Department Course   ECG  ECG taken at 1041, ECG read at 1103  Normal sinus rhythm  Minimal voltage criteria for LVH, may be normal variant (Jerod product)  Rate 60 bpm. DC interval 178 ms. QRS duration 94 ms. QT/QTc 384/384 ms. P-R-T axes 79 -15 77.     EKG is read by Dr. Harris and is normal.     Laboratory:  Labs Ordered and Resulted from Time of ED Arrival to Time of ED Departure   COMPREHENSIVE METABOLIC PANEL - Abnormal       Result Value    Sodium 137      Potassium 3.7      Carbon Dioxide (CO2) 28      Anion Gap 10      Urea Nitrogen 9.7      Creatinine 0.74      GFR Estimate " >90      Calcium 9.5      Chloride 99      Glucose 100 (*)     Alkaline Phosphatase 66      AST 29      ALT 16      Protein Total 7.2      Albumin 4.5      Bilirubin Total 1.0     LIPASE - Normal    Lipase 32     TROPONIN T, HIGH SENSITIVITY - Normal    Troponin T, High Sensitivity 6     CBC WITH PLATELETS AND DIFFERENTIAL    WBC Count 5.3      RBC Count 4.47      Hemoglobin 13.7      Hematocrit 42.1      MCV 94      MCH 30.6      MCHC 32.5      RDW 12.2      Platelet Count 405      % Neutrophils 66      % Lymphocytes 24      % Monocytes 7      % Eosinophils 2      % Basophils 1      % Immature Granulocytes 0      NRBCs per 100 WBC 0      Absolute Neutrophils 3.5      Absolute Lymphocytes 1.3      Absolute Monocytes 0.4      Absolute Eosinophils 0.1      Absolute Basophils 0.0      Absolute Immature Granulocytes 0.0      Absolute NRBCs 0.0          Emergency Department Course & Assessments:    Interventions/ED Medications:  Medications   alum & mag hydroxide-simethicone (MAALOX) suspension 30 mL (30 mLs Oral $Given 11/18/23 1027)      Independent Interpretation of Radiology Studies by Dr. Harris  None    Assessments/Consultations/Discussion of Management:  1000 I examined the patient and obtained history as noted above.  1145 I rechecked and updated the patient.    Disposition:  The patient was discharged to home.     Impression & Plan      Medical Decision Making:  This patient presents to the emergency department with a history of gastroesophageal reflux disease remotely.  She is now had symptoms again for several months.  She has been taking famotidine and now is on omeprazole 40 mg a day and is now just been prescribed pantoprazole 40 mg daily.  She is waiting to get into see GI for upper endoscopy.  I believe an outpatient referral has already been placed.  I placed another referral for our local gastroenterology  who will call her on Monday or Tuesday (today is Saturday).  Patient should continue taking  the pantoprazole and in addition may take over-the-counter antiacid such as Tums, Maalox, or other equivalent and acid preparations.  The patient will need an upper endoscopy to look for Soler's esophagus, distal esophagitis, gastritis, and will occult peptic ulcer disease.  Testing for helical back to pylori is reasonable.      Diagnosis:    ICD-10-CM    1. Gastroesophageal reflux disease, unspecified whether esophagitis present  K21.9 Adult GI  Referral - Procedure Only               Scribe Disclosure:  Rudy WARREN, am serving as a scribe at 11:14 AM on 11/18/2023 to document services personally performed by Neville Harris MD based on my observations and the provider's statements to me.     11/18/2023   Neville Harris MD Rock, Michael P, MD  11/18/23 1151

## 2023-11-20 ENCOUNTER — TELEPHONE (OUTPATIENT)
Dept: GASTROENTEROLOGY | Facility: CLINIC | Age: 58
End: 2023-11-20
Payer: COMMERCIAL

## 2023-11-20 NOTE — TELEPHONE ENCOUNTER
Pre assessment completed for upcoming procedure.      Procedure details:    Patient scheduled for Upper endoscopy (EGD) on 11/22/23.     Arrival time: 0730. Procedure time 0815    Pre op exam needed? N/A    Facility location: [facility]; [facility] [address] [address]    Sedation type: Conscious sedation     Indication for procedure: Severe reflux symptoms.  Evaluate for Soler's esophagus, peptic ulcer disease    COVID policy reviewed.    Designated  policy reviewed. Instructed to have someone stay 6 hours post procedure.       Chart review:     Electronic implanted devices? No    Recent diagnosis of diverticulitis within the last 6 weeks?  N/A    Diabetic? No    Diabetic medication HOLDING recommendations: (if applicable)  Oral diabetic medications: N/A  Diabetic injectables: N/A  Insulin: N/A    Medication review:    Anticoagulants? No    NSAIDS? No, patient reports no longer taking Ibuprofen since reflux symptoms.     Other medication HOLDING recommendations:  N/A      Prep for procedure:     Bowel prep recommendation: N/A    Prep instructions sent via [employer] by .     Reviewed procedure prep instructions.     Patient verbalized understanding and had no questions or concerns at this time.        Eleni An RN  Endoscopy Procedure Pre Assessment RN  [phone] option 4

## 2023-11-20 NOTE — TELEPHONE ENCOUNTER
"Endoscopy Scheduling Screen    Have you had a positive Covid test in the last 14 days?  No    Are you active on MyChart?   Yes    What insurance is in the chart?  Other:  BCBS    Ordering/Referring Provider:   GENIE OCAMPO        (If ordering provider performs procedure, schedule with ordering provider unless otherwise instructed. )    BMI: Estimated body mass index is 18.48 kg/m  as calculated from the following:    Height as of 11/18/23: 1.702 m (5' 7\").    Weight as of 11/18/23: 53.5 kg (118 lb).     Sedation Ordered  moderate sedation.   If patient BMI > 50 do not schedule in [facility].    If patient BMI > 45 do not schedule at [facility].    Are you taking methadone or Suboxone?  No    Are you taking any prescription medications for pain 3 or more times per week?   No    Do you have a history of malignant hyperthermia or adverse reaction to anesthesia?  No    (Females) Are you currently pregnant?   No     Have you been diagnosed or told you have pulmonary hypertension?   No    Do you have an LVAD?  No    Have you been told you have moderate to severe sleep apnea?  No    Have you been told you have COPD, asthma, or any other lung disease?  No    Do you have any heart conditions?  No     Have you ever had an organ transplant?   No    Have you ever had or are you awaiting a heart or lung transplant?   No    Have you had a stroke or transient ischemic attack (TIA aka \"mini stroke\" in the last 6 months?   No    Have you been diagnosed with or been told you have cirrhosis of the liver?   No    Are you currently on dialysis?   No    Do you need assistance transferring?   No    BMI: Estimated body mass index is 18.48 kg/m  as calculated from the following:    Height as of 11/18/23: 1.702 m (5' 7\").    Weight as of 11/18/23: 53.5 kg (118 lb).     Is patients BMI > 40 and scheduling location [facility]?  No    Do you take an injectable medication for weight loss or diabetes (excluding insulin)?  No    Do you take the medication " Naltrexone?  No    Do you take blood thinners?  No       Prep   Are you currently on dialysis or do you have chronic kidney disease?  No    Do you have a diagnosis of diabetes?  No    Do you have a diagnosis of cystic fibrosis (CF)?  No    On a regular basis do you go 3 -5 days between bowel movements?      BMI > 40?      Preferred Pharmacy:    [id number] [address] [facility] [address] [place] [id number]  Phone: [phone] Fax: [phone]      Final Scheduling Details   Colonoscopy prep sent?  N/A    Procedure scheduled  Upper endoscopy (EGD)    Surgeon:  MARYBETH     Date of procedure:  11/22/2023     Pre-OP / PAC:   No - Not required for this site.    Location  [facility] - Patient preference.    Sedation   Moderate Sedation - Per order.      Patient Reminders:   You will receive a call from a Nurse to review instructions and health history.  This assessment must be completed prior to your procedure.  Failure to complete the Nurse assessment may result in the procedure being cancelled.      On the day of your procedure, please designate an adult(s) who can drive you home stay with you for the next 24 hours. The medicines used in the exam will make you sleepy. You will not be able to drive.      You cannot take public transportation, ride share services, or non-medical taxi service without a responsible caregiver.  Medical transport services are allowed with the requirement that a responsible caregiver will receive you at your destination.  We require that drivers and caregivers are confirmed prior to your procedure.

## 2023-11-22 ENCOUNTER — HOSPITAL ENCOUNTER (OUTPATIENT)
Facility: CLINIC | Age: 58
Discharge: HOME | End: 2023-11-22
Attending: INTERNAL MEDICINE | Admitting: INTERNAL MEDICINE
Payer: COMMERCIAL

## 2023-11-22 VITALS
SYSTOLIC BLOOD PRESSURE: 115 MMHG | OXYGEN SATURATION: 98 % | HEART RATE: 78 BPM | WEIGHT: 118 LBS | HEIGHT: 67 IN | DIASTOLIC BLOOD PRESSURE: 76 MMHG | BODY MASS INDEX: 18.52 KG/M2 | RESPIRATION RATE: 14 BRPM

## 2023-11-22 DIAGNOSIS — K21.9 GASTROESOPHAGEAL REFLUX DISEASE WITHOUT ESOPHAGITIS: Primary | ICD-10-CM

## 2023-11-22 LAB — UPPER GI ENDOSCOPY: NORMAL

## 2023-11-22 PROCEDURE — 250N000009 HC RX 250: Performed by: INTERNAL MEDICINE

## 2023-11-22 PROCEDURE — 88305 TISSUE EXAM BY PATHOLOGIST: CPT | Mod: 26 | Performed by: PATHOLOGY

## 2023-11-22 PROCEDURE — 43239 EGD BIOPSY SINGLE/MULTIPLE: CPT | Performed by: INTERNAL MEDICINE

## 2023-11-22 PROCEDURE — 250N000011 HC RX IP 250 OP 636: Performed by: INTERNAL MEDICINE

## 2023-11-22 PROCEDURE — G0500 MOD SEDAT ENDO SERVICE >5YRS: HCPCS | Performed by: INTERNAL MEDICINE

## 2023-11-22 PROCEDURE — 88305 TISSUE EXAM BY PATHOLOGIST: CPT | Mod: TC | Performed by: INTERNAL MEDICINE

## 2023-11-22 PROCEDURE — 43251 EGD REMOVE LESION SNARE: CPT | Performed by: INTERNAL MEDICINE

## 2023-11-22 RX ORDER — ONDANSETRON 4 MG/1
4 TABLET, ORALLY DISINTEGRATING ORAL EVERY 6 HOURS PRN
Status: CANCELLED | OUTPATIENT
Start: 2023-11-22

## 2023-11-22 RX ORDER — ONDANSETRON 2 MG/ML
4 INJECTION INTRAMUSCULAR; INTRAVENOUS
Status: COMPLETED | OUTPATIENT
Start: 2023-11-22 | End: 2023-11-22

## 2023-11-22 RX ORDER — FENTANYL CITRATE 50 UG/ML
INJECTION, SOLUTION INTRAMUSCULAR; INTRAVENOUS PRN
Status: DISCONTINUED | OUTPATIENT
Start: 2023-11-22 | End: 2023-11-22 | Stop reason: HOSPADM

## 2023-11-22 RX ORDER — NALOXONE HYDROCHLORIDE 0.4 MG/ML
0.4 INJECTION, SOLUTION INTRAMUSCULAR; INTRAVENOUS; SUBCUTANEOUS
Status: CANCELLED | OUTPATIENT
Start: 2023-11-22

## 2023-11-22 RX ORDER — LIDOCAINE 40 MG/G
CREAM TOPICAL
Status: DISCONTINUED | OUTPATIENT
Start: 2023-11-22 | End: 2023-11-22 | Stop reason: HOSPADM

## 2023-11-22 RX ORDER — FLUMAZENIL 0.1 MG/ML
0.2 INJECTION, SOLUTION INTRAVENOUS
Status: CANCELLED | OUTPATIENT
Start: 2023-11-22 | End: 2023-11-22

## 2023-11-22 RX ORDER — ONDANSETRON 2 MG/ML
4 INJECTION INTRAMUSCULAR; INTRAVENOUS EVERY 6 HOURS PRN
Status: CANCELLED | OUTPATIENT
Start: 2023-11-22

## 2023-11-22 RX ORDER — NALOXONE HYDROCHLORIDE 0.4 MG/ML
0.2 INJECTION, SOLUTION INTRAMUSCULAR; INTRAVENOUS; SUBCUTANEOUS
Status: CANCELLED | OUTPATIENT
Start: 2023-11-22

## 2023-11-22 RX ORDER — SODIUM CHLORIDE, SODIUM LACTATE, POTASSIUM CHLORIDE, CALCIUM CHLORIDE 600; 310; 30; 20 MG/100ML; MG/100ML; MG/100ML; MG/100ML
INJECTION, SOLUTION INTRAVENOUS CONTINUOUS
Status: DISCONTINUED | OUTPATIENT
Start: 2023-11-22 | End: 2023-11-22 | Stop reason: HOSPADM

## 2023-11-22 RX ORDER — PROCHLORPERAZINE MALEATE 10 MG
10 TABLET ORAL EVERY 6 HOURS PRN
Status: CANCELLED | OUTPATIENT
Start: 2023-11-22

## 2023-11-22 RX ORDER — PANTOPRAZOLE SODIUM 40 MG/1
40 TABLET, DELAYED RELEASE ORAL
Qty: 60 TABLET | Refills: 3 | Status: SHIPPED | OUTPATIENT
Start: 2023-11-22 | End: 2024-03-19

## 2023-11-22 RX ADMIN — ONDANSETRON 4 MG: 2 INJECTION INTRAMUSCULAR; INTRAVENOUS at 09:52

## 2023-11-22 ASSESSMENT — ACTIVITIES OF DAILY LIVING (ADL): ADLS_ACUITY_SCORE: 35

## 2023-11-22 NOTE — H&P
"Denise L Soulier  [id number]  female  58 year old      Reason for procedure/surgery: GERD    Patient Active Problem List   Diagnosis    GERD (gastroesophageal reflux disease)    Myoclonus    CARDIOVASCULAR SCREENING; LDL GOAL LESS THAN 160    Nocturnal myoclonus    Insomnia    Atopic rhinitis    Muscle aches    Trigger thumb of right hand       Past Surgical History:    Past Surgical History:   Procedure Laterality Date    RELEASE TRIGGER FINGER Right 12/12/2019    Procedure: Right trigger thumb release;  Surgeon: Pete Chandra MD;  Location:  OR    SURGICAL HISTORY OF [facility]       Past Medical History:   Past Medical History:   Diagnosis Date    Allergies     seasonal spring and fall    GERD (gastroesophageal reflux disease)     Myoclonus Sept, 08    Sinus infection     chronic       Social History:   Social History     Tobacco Use    Smoking status: Never    Smokeless tobacco: Never   Substance Use Topics    Alcohol use: No       Family History:   Family History   Problem Relation Age of Onset    Lipids Mother     Asthma Mother     Chronic Obstructive Pulmonary Disease Mother     Rheumatoid Arthritis Mother     Bronchitis Mother     Alcohol/Drug Father     Diabetes Paternal Grandmother     Depression Sister     Substance Abuse Brother        Allergies:   Allergies   Allergen Reactions    Pcn [Penicillins]        Active Medications:   No current outpatient medications on file.       Systemic Review:   CONSTITUTIONAL: NEGATIVE for fever, chills, change in weight  ENT/MOUTH: NEGATIVE for ear, mouth and throat problems  RESP: NEGATIVE for significant cough or SOB  CV: NEGATIVE for chest pain, palpitations or peripheral edema    Physical Examination:   Vital Signs: /78   Pulse 81   Resp 16   Ht 1.689 m (5' 6.5\")   Wt 53.5 kg (118 lb)   LMP 10/23/2015   SpO2 99%   BMI 18.76 kg/m    GENERAL: healthy, alert and no distress  NECK: no adenopathy, no asymmetry, masses, or scars  RESP: lungs " clear to auscultation - no rales, rhonchi or wheezes  CV: regular rate and rhythm, normal S1 S2, no S3 or S4, no murmur, click or rub, no peripheral edema and peripheral pulses strong  ABDOMEN: soft, nontender, no hepatosplenomegaly, no masses and bowel sounds normal  MS: no gross musculoskeletal defects noted, no edema    ASA Classification: (II)  Mild systemic disease  Airway Exam: Mallampati Score: Class II (Complete visualization of uvula)    Plan: Appropriate to proceed as scheduled.      Steve Champion MD  11/22/2023    PCP:  Tru Wallace

## 2023-11-27 LAB
PATH REPORT.COMMENTS IMP SPEC: NORMAL
PATH REPORT.FINAL DX SPEC: NORMAL
PATH REPORT.GROSS SPEC: NORMAL
PATH REPORT.MICROSCOPIC SPEC OTHER STN: NORMAL
PATH REPORT.RELEVANT HX SPEC: NORMAL
PHOTO IMAGE: NORMAL

## 2023-12-11 ENCOUNTER — OFFICE VISIT (OUTPATIENT)
Dept: FAMILY MEDICINE | Facility: CLINIC | Age: 58
End: 2023-12-11
Payer: COMMERCIAL

## 2023-12-11 VITALS
HEIGHT: 66 IN | TEMPERATURE: 96.9 F | DIASTOLIC BLOOD PRESSURE: 72 MMHG | OXYGEN SATURATION: 100 % | SYSTOLIC BLOOD PRESSURE: 115 MMHG | BODY MASS INDEX: 18.47 KG/M2 | WEIGHT: 114.9 LBS | RESPIRATION RATE: 16 BRPM | HEART RATE: 78 BPM

## 2023-12-11 DIAGNOSIS — R63.4 WEIGHT LOSS: ICD-10-CM

## 2023-12-11 DIAGNOSIS — R10.13 EPIGASTRIC PAIN: ICD-10-CM

## 2023-12-11 DIAGNOSIS — K21.9 GASTROESOPHAGEAL REFLUX DISEASE, UNSPECIFIED WHETHER ESOPHAGITIS PRESENT: Primary | ICD-10-CM

## 2023-12-11 DIAGNOSIS — G47.00 INSOMNIA, UNSPECIFIED TYPE: ICD-10-CM

## 2023-12-11 DIAGNOSIS — F41.9 ANXIETY: ICD-10-CM

## 2023-12-11 DIAGNOSIS — M25.50 POLYARTHRALGIA: ICD-10-CM

## 2023-12-11 LAB
CRP SERPL-MCNC: <3 MG/L
ERYTHROCYTE [SEDIMENTATION RATE] IN BLOOD BY WESTERGREN METHOD: 9 MM/HR (ref 0–30)
RHEUMATOID FACT SERPL-ACNC: <10 IU/ML

## 2023-12-11 PROCEDURE — 86039 ANTINUCLEAR ANTIBODIES (ANA): CPT | Performed by: STUDENT IN AN ORGANIZED HEALTH CARE EDUCATION/TRAINING PROGRAM

## 2023-12-11 PROCEDURE — 36415 COLL VENOUS BLD VENIPUNCTURE: CPT | Performed by: STUDENT IN AN ORGANIZED HEALTH CARE EDUCATION/TRAINING PROGRAM

## 2023-12-11 PROCEDURE — 86038 ANTINUCLEAR ANTIBODIES: CPT | Performed by: STUDENT IN AN ORGANIZED HEALTH CARE EDUCATION/TRAINING PROGRAM

## 2023-12-11 PROCEDURE — 86200 CCP ANTIBODY: CPT | Performed by: STUDENT IN AN ORGANIZED HEALTH CARE EDUCATION/TRAINING PROGRAM

## 2023-12-11 PROCEDURE — 85652 RBC SED RATE AUTOMATED: CPT | Performed by: STUDENT IN AN ORGANIZED HEALTH CARE EDUCATION/TRAINING PROGRAM

## 2023-12-11 PROCEDURE — 86140 C-REACTIVE PROTEIN: CPT | Performed by: STUDENT IN AN ORGANIZED HEALTH CARE EDUCATION/TRAINING PROGRAM

## 2023-12-11 PROCEDURE — 86225 DNA ANTIBODY NATIVE: CPT | Performed by: STUDENT IN AN ORGANIZED HEALTH CARE EDUCATION/TRAINING PROGRAM

## 2023-12-11 PROCEDURE — 86431 RHEUMATOID FACTOR QUANT: CPT | Performed by: STUDENT IN AN ORGANIZED HEALTH CARE EDUCATION/TRAINING PROGRAM

## 2023-12-11 PROCEDURE — 99214 OFFICE O/P EST MOD 30 MIN: CPT | Performed by: STUDENT IN AN ORGANIZED HEALTH CARE EDUCATION/TRAINING PROGRAM

## 2023-12-11 RX ORDER — HYDROXYZINE HYDROCHLORIDE 25 MG/1
25-50 TABLET, FILM COATED ORAL EVERY 6 HOURS PRN
Qty: 60 TABLET | Refills: 1 | Status: SHIPPED | OUTPATIENT
Start: 2023-12-11

## 2023-12-11 ASSESSMENT — PAIN SCALES - GENERAL: PAINLEVEL: MODERATE PAIN (4)

## 2023-12-11 NOTE — PROGRESS NOTES
Assessment & Plan     Gastroesophageal reflux disease, unspecified whether esophagitis present  Epigastric pain  Weight loss   Pt having reflux. Recent EGD showed findings consistent with reflux, otherwise normal. Pantoprazole is providing minimal relief. Pt very worried as she is now having weight loss (down 4 lbs in past month), intermittent epigastric pain, constipation, and persistent GERD. Denies CP, palpitations, dizziness, blood in stool or vomit, fevers. Vitals and exam unremarkable. Recent labs (tsh, lipase, cmp, cbc, ferritin, magnesium) normal. Cologuard from 10/19/23 normal. Has anxiety, which is likely exacerbating symptoms. Unclear etiology. Broad differential including: hiatal hernia, resistant GERD, stress/anxiety, gallbladder pathology, malignancy, ect...  Will get CT ab/pelvis and refer to GI. Warrants close follow up due to weight loss.  - CT Abdomen Pelvis w/o & w Contrast  - Adult GI  Referral - Consult Only    Polyarthralgia  Diffuse joint pain in feet, knees, hips, hands b/l. No swelling. Will get labs below.   - CRP, inflammation  - ESR: Erythrocyte sedimentation rate  - Anti Nuclear Laxmi IgG by IFA with Reflex  - DNA double stranded antibodies  - Cyclic Citrullinated Peptide Antibody IgG  - Rheumatoid factor  - CRP, inflammation  - ESR: Erythrocyte sedimentation rate  - Anti Nuclear Laxmi IgG by IFA with Reflex  - DNA double stranded antibodies  - Cyclic Citrullinated Peptide Antibody IgG  - Rheumatoid factor    Anxiety  Insomnia, unspecified type  Having health anxiety related to the above symptoms. Provided reassurance and support. Will refer for talk therapy to help with stress, anxiety and coping strategies. Will start hydroxyzine PRN for sleep and anxiety.   - Adult Mental Health  Referral  - hydrOXYzine HCl (ATARAX) 25 MG tablet  Dispense: 60 tablet; Refill: 1      Ant Bardales DO  [facility]    Gabriel Webster is a 58 year old,  "presenting for the following health issues:  Follow Up (GI Issues, Joint pain and insomnia ), Sleep Problem, and Arthritis        12/11/2023     9:41 AM   Additional Questions   Roomed by Anyi   Accompanied by Mely         12/11/2023     9:41 AM   Patient Reported Additional Medications   Patient reports taking the following new medications None       History of Present Illness       Reason for visit:  Followup gi symptoms new joint pain and insomnia    She eats 4 or more servings of fruits and vegetables daily.She consumes 0 sweetened beverage(s) daily.She exercises with enough effort to increase her heart rate 10 to 19 minutes per day.  She exercises with enough effort to increase her heart rate 5 days per week.   She is taking medications regularly.     Follow up GI issues  -had endoscopy-normal other than findings consistent with reflux  -started protonix-not helping  -consider referral to GI    Weight loss  4 lbs in 1 month  Appetite is ok, some nausea (3 smaller meals per day, some snacks in between)  Empty/tight feeling in epigastric region  Cramping, constipation (new)  (+) headaches,   Denies: fever, palpitations, dizziness, blood in stool, no blood in vomit  Anxiety-worried about symptoms  Protonix 40mg BID with minimal relief    Joint pain  Feeling \"heat in the body in the joints\"  Stiff/sore  Notices it in feet, knees, hands, hips, bilateral  Constant cracking sensation  Intermittent  Tylenol does not help    Insomnia    Wt Readings from Last 2 Encounters:   12/11/23 52.1 kg (114 lb 14.4 oz)   11/22/23 53.5 kg (118 lb)       Review of Systems   Musculoskeletal:  Positive for arthritis.          Objective    /72 (BP Location: Right arm, Patient Position: Sitting, Cuff Size: Adult Regular)   Pulse 78   Temp 96.9  F (36.1  C) (Temporal)   Resp 16   Ht 1.687 m (5' 6.42\")   Wt 52.1 kg (114 lb 14.4 oz)   LMP 10/23/2015   SpO2 100%   Breastfeeding No   BMI 18.31 kg/m    Body mass index is 18.31 " kg/m .    Physical Exam  Constitutional:       General: She is not in acute distress.     Appearance: She is not ill-appearing.   HENT:      Mouth/Throat:      Mouth: Mucous membranes are moist.      Pharynx: Oropharynx is clear.   Eyes:      Extraocular Movements: Extraocular movements intact.      Conjunctiva/sclera: Conjunctivae normal.      Pupils: Pupils are equal, round, and reactive to light.   Cardiovascular:      Rate and Rhythm: Normal rate and regular rhythm.      Heart sounds: Normal heart sounds.   Pulmonary:      Effort: Pulmonary effort is normal.      Breath sounds: Normal breath sounds.   Abdominal:      General: Abdomen is flat. Bowel sounds are normal. There is no distension.      Palpations: Abdomen is soft. There is no mass.      Tenderness: There is no abdominal tenderness. There is no guarding or rebound.      Hernia: No hernia is present.      Comments: No splenomegaly  Negative whitehead's sign   Musculoskeletal:      Cervical back: Normal range of motion and neck supple. No rigidity or tenderness.   Lymphadenopathy:      Cervical: No cervical adenopathy.   Skin:     General: Skin is warm and dry.      Findings: No rash.   Neurological:      General: No focal deficit present.      Mental Status: She is alert.   Psychiatric:         Attention and Perception: Attention normal.         Mood and Affect: Mood is anxious.         Speech: Speech normal.         Behavior: Behavior is cooperative.         Thought Content: Thought content normal.         Cognition and Memory: Cognition normal.

## 2023-12-11 NOTE — PATIENT INSTRUCTIONS
Schedule CT scan  Referral to GI specialist  Referral to talk therapy to help with anxiety  Use hydroxyzine 1-2 tablets before bed as needed for sleep and anxiety  Labs today      Dr. Bardales

## 2023-12-12 LAB
ANA PAT SER IF-IMP: ABNORMAL
ANA SER QL IF: POSITIVE
ANA TITR SER IF: ABNORMAL {TITER}

## 2023-12-13 ENCOUNTER — ANCILLARY PROCEDURE (OUTPATIENT)
Dept: CT IMAGING | Facility: CLINIC | Age: 58
End: 2023-12-13
Attending: STUDENT IN AN ORGANIZED HEALTH CARE EDUCATION/TRAINING PROGRAM
Payer: COMMERCIAL

## 2023-12-13 DIAGNOSIS — M25.50 POLYARTHRALGIA: ICD-10-CM

## 2023-12-13 DIAGNOSIS — K21.9 GASTROESOPHAGEAL REFLUX DISEASE, UNSPECIFIED WHETHER ESOPHAGITIS PRESENT: ICD-10-CM

## 2023-12-13 DIAGNOSIS — R76.8 POSITIVE DOUBLE STRANDED DNA ANTIBODY TEST: ICD-10-CM

## 2023-12-13 DIAGNOSIS — R63.4 WEIGHT LOSS: ICD-10-CM

## 2023-12-13 DIAGNOSIS — R76.8 ANA POSITIVE: Primary | ICD-10-CM

## 2023-12-13 DIAGNOSIS — R10.13 EPIGASTRIC PAIN: ICD-10-CM

## 2023-12-13 LAB
CCP AB SER IA-ACNC: 1.5 U/ML
DSDNA AB SER-ACNC: 13 IU/ML

## 2023-12-13 PROCEDURE — 74177 CT ABD & PELVIS W/CONTRAST: CPT | Mod: GC | Performed by: RADIOLOGY

## 2023-12-13 RX ORDER — IOPAMIDOL 755 MG/ML
69 INJECTION, SOLUTION INTRAVASCULAR ONCE
Status: COMPLETED | OUTPATIENT
Start: 2023-12-13 | End: 2023-12-13

## 2023-12-13 RX ADMIN — IOPAMIDOL 69 ML: 755 INJECTION, SOLUTION INTRAVASCULAR at 14:07

## 2023-12-13 NOTE — DISCHARGE INSTRUCTIONS

## 2023-12-25 ENCOUNTER — HOSPITAL ENCOUNTER (EMERGENCY)
Facility: CLINIC | Age: 58
Discharge: HOME | End: 2023-12-25
Attending: EMERGENCY MEDICINE | Admitting: EMERGENCY MEDICINE
Payer: COMMERCIAL

## 2023-12-25 VITALS
HEIGHT: 66 IN | WEIGHT: 114 LBS | BODY MASS INDEX: 18.32 KG/M2 | OXYGEN SATURATION: 99 % | RESPIRATION RATE: 16 BRPM | HEART RATE: 69 BPM | SYSTOLIC BLOOD PRESSURE: 116 MMHG | DIASTOLIC BLOOD PRESSURE: 69 MMHG | TEMPERATURE: 97.7 F

## 2023-12-25 DIAGNOSIS — K21.00 GASTROESOPHAGEAL REFLUX DISEASE WITH ESOPHAGITIS WITHOUT HEMORRHAGE: ICD-10-CM

## 2023-12-25 DIAGNOSIS — F41.9 ANXIETY: ICD-10-CM

## 2023-12-25 PROCEDURE — 99284 EMERGENCY DEPT VISIT MOD MDM: CPT

## 2023-12-25 RX ORDER — ESCITALOPRAM OXALATE 5 MG/1
5 TABLET ORAL DAILY
Qty: 30 TABLET | Refills: 0 | Status: SHIPPED | OUTPATIENT
Start: 2023-12-25 | End: 2023-12-29

## 2023-12-25 RX ORDER — SUCRALFATE ORAL 1 G/10ML
1 SUSPENSION ORAL 4 TIMES DAILY
Qty: 420 ML | Refills: 0 | Status: SHIPPED | OUTPATIENT
Start: 2023-12-25 | End: 2023-12-29

## 2023-12-25 ASSESSMENT — ACTIVITIES OF DAILY LIVING (ADL): ADLS_ACUITY_SCORE: 35

## 2023-12-25 NOTE — ED TRIAGE NOTES
"Pt states that she has all over joint pain;  states that it feels like \"burning.\"  Pt states that she went to a doctor who took some inflammation markers which were elevated.  Was told to follow up.        "

## 2023-12-25 NOTE — ED PROVIDER NOTES
"  History     Chief Complaint:  Joint Pain       HPI   Denise L Soulier is a 58 year old female with a history of GERD and insomnia who presents to the ED for burning join pain which started as burning pain throughout the whole body and reflux that was not improving. She states it has been causing her panic attacks, anxiety, and unable to sleep. She is also losing weight. She reports hydroxyzine makes her feel strange so she does not take it. Ibuprofen and tylenol are not effective. She is not on a diuretic. She takes Protonix BID which she reports is not effective. Denies cough, sore throat, fever or chills. Denies intentional weight loss or thyroid issues. She is seeing a rheumatologist on 1/8/24.     Independent Historian:   None - Patient Only    Review of External Notes:   Clinic note from 12/11/2023     Medications:    Protonix     Past Medical History:    GERD  Sinus infection   Myoclonus   Insomnia   Atopic rhinitis   Trigger thumb, right     Past Surgical History:    Esophagogastroduodenoscopy  Trigger finger release  Broughton teeth surgery    Physical Exam   Patient Vitals for the past 24 hrs:   BP Temp Temp src Pulse Resp SpO2 Height Weight   12/25/23 0944 116/69 97.7  F (36.5  C) Oral 69 16 99 % 1.676 m (5' 6\") 51.7 kg (114 lb)        Physical Exam  Constitutional: Thin white female. Supine. No respiratory distress.  HENT: No signs of trauma.   Eyes: EOM are normal. Pupils are equal, round, and reactive to light.   Neck: Normal range of motion. No JVD present. No cervical adenopathy.  Cardiovascular: Regular rhythm.  Exam reveals no gallop and no friction rub.    No murmur heard.  Pulmonary/Chest: Bilateral breath sounds normal. No wheezes, rhonchi or rales.  Abdominal: Soft. No tenderness. No rebound or guarding.   Musculoskeletal: No edema. No tenderness. No swelling or tenderness of joints.   Lymphadenopathy: No lymphadenopathy.   Neurological: Alert and oriented to person, place, and time. Normal " strength. Coordination normal.   Skin: Skin is warm and dry. No rash noted. No erythema.   Psych: Very anxious. No overt psychosis.    Emergency Department Course     Emergency Department Course & Assessments:    Interventions:  Medications - No data to display     Assessments:  1048 I obtained the history and examined the patient as detailed above.     Independent Interpretation (X-rays, CTs, rhythm strip):  None    Consultations/Discussion of Management or Tests:  None     Social Determinants of Health affecting care:   None    Disposition:  The patient was discharged to home.     Impression & Plan      Medical Decision Making:  This then middle-aged female presents with tingling joint pain and reflex problems a month ago she underwent EGD which was negative she has been taking Protonix twice a day but ran out she did see her primary 2 weeks ago who did rheumatoid studies and found that her double-stranded DNA was slightly elevated was elevated and her family was positive sed rate and CRP was negative she was scheduled to see rheumatology in about a week.  She does admit to be very anxious at times and this seems to make things work.  She has no anorexic or bulimic history.  She has had thyroid studies done which have been normal.  I did review her labs from the clinic and did not feel we needed any more done at this time.  I will place patient on Carafate to help with her esophagitis and also she will start escitalopram she has not felt she should make a follow-up with her PMD in the next week and keep her appointment with rheumatology as scheduled.  If symptoms change or worsen recheck in the ED    Diagnosis:    ICD-10-CM    1. Gastroesophageal reflux disease with esophagitis without hemorrhage  K21.00       2. Anxiety  F41.9            Discharge Medications:  New Prescriptions    ESCITALOPRAM (LEXAPRO) 5 MG TABLET    Take 1 tablet (5 mg) by mouth daily    SUCRALFATE (CARAFATE) 1 GM/10ML SUSPENSION    Take 10  mLs (1 g) by mouth 4 times daily     Scribe Disclosure:  I, Venkatesh Go, am serving as a scribe at 11:03 AM on 12/25/2023 to document services personally performed by Charbel Tinsley MD based on my observations and the provider's statements to me.   12/25/2023   Charbel Tinsley MD Steinman, Randall Ira, MD  12/25/23 [id number]

## 2023-12-25 NOTE — ED NOTES
Patient left discharge AVS and prescriptions in room upon discharge. Attempted to call patient to inform were at front triage desk for patient to come  but patient did not answer nor have voicemail set up.

## 2023-12-29 RX ORDER — SUCRALFATE 1 G/1
1 TABLET ORAL 4 TIMES DAILY
Qty: 42 TABLET | Refills: 0 | Status: SHIPPED | OUTPATIENT
Start: 2023-12-29 | End: 2024-03-19

## 2023-12-29 RX ORDER — ESCITALOPRAM OXALATE 5 MG/1
5 TABLET ORAL DAILY
Qty: 30 TABLET | Refills: 0 | Status: SHIPPED | OUTPATIENT
Start: 2023-12-29 | End: 2024-02-02

## 2023-12-29 NOTE — ED NOTES
Patient called the emergency department today, stating that she accidentally left her printed prescription for Carafate and Lexapro in the emergency department when she left on 12/25/2023.  She is asking that we send the prescription electronically to [facility].  I obliged and sent the one-time prescription refill of Carafate and Lexapro to [facility].    Kal Cottrell MD on 12/29/2023 at 5:01 PM       Kal Cottrell MD  12/29/23 5118

## 2024-01-03 ENCOUNTER — OFFICE VISIT (OUTPATIENT)
Dept: URGENT CARE | Facility: URGENT CARE | Age: 59
End: 2024-01-03
Payer: COMMERCIAL

## 2024-01-03 VITALS
BODY MASS INDEX: 18.32 KG/M2 | DIASTOLIC BLOOD PRESSURE: 82 MMHG | RESPIRATION RATE: 26 BRPM | WEIGHT: 114 LBS | SYSTOLIC BLOOD PRESSURE: 130 MMHG | HEART RATE: 85 BPM | OXYGEN SATURATION: 97 % | HEIGHT: 66 IN | TEMPERATURE: 98 F

## 2024-01-03 DIAGNOSIS — F41.9 ANXIETY: ICD-10-CM

## 2024-01-03 DIAGNOSIS — R20.2 PARESTHESIAS: Primary | ICD-10-CM

## 2024-01-03 LAB
ANION GAP SERPL CALCULATED.3IONS-SCNC: 10 MMOL/L (ref 7–15)
BUN SERPL-MCNC: 18 MG/DL (ref 6–20)
CALCIUM SERPL-MCNC: 9.7 MG/DL (ref 8.6–10)
CHLORIDE SERPL-SCNC: 97 MMOL/L (ref 98–107)
CK SERPL-CCNC: 63 U/L (ref 26–192)
CREAT SERPL-MCNC: 0.68 MG/DL (ref 0.51–0.95)
DEPRECATED HCO3 PLAS-SCNC: 29 MMOL/L (ref 22–29)
EGFRCR SERPLBLD CKD-EPI 2021: >90 ML/MIN/1.73M2
GLUCOSE SERPL-MCNC: 119 MG/DL (ref 70–99)
MAGNESIUM SERPL-MCNC: 2.1 MG/DL (ref 1.7–2.3)
POTASSIUM SERPL-SCNC: 4 MMOL/L (ref 3.4–5.3)
SODIUM SERPL-SCNC: 136 MMOL/L (ref 135–145)

## 2024-01-03 PROCEDURE — 99213 OFFICE O/P EST LOW 20 MIN: CPT | Performed by: INTERNAL MEDICINE

## 2024-01-03 PROCEDURE — 80048 BASIC METABOLIC PNL TOTAL CA: CPT | Performed by: INTERNAL MEDICINE

## 2024-01-03 PROCEDURE — 36415 COLL VENOUS BLD VENIPUNCTURE: CPT | Performed by: INTERNAL MEDICINE

## 2024-01-03 PROCEDURE — 83735 ASSAY OF MAGNESIUM: CPT | Performed by: INTERNAL MEDICINE

## 2024-01-03 PROCEDURE — 82550 ASSAY OF CK (CPK): CPT | Performed by: INTERNAL MEDICINE

## 2024-01-03 NOTE — PROGRESS NOTES
ASSESSMENT AND PLAN:      ICD-10-CM    1. Paresthesias  R20.2 Basic metabolic panel  (Ca, Cl, CO2, Creat, Gluc, K, Na, BUN)     Magnesium     CK total     Basic metabolic panel  (Ca, Cl, CO2, Creat, Gluc, K, Na, BUN)     Magnesium     CK total      2. Anxiety  F41.9       Rheum appt next week.  If symptoms persists of paresthesias, discussed consider Neurology    Patient Instructions   Pins & needles can be anxiety  Screening electrolytes, magnesium, CK muscle enzymes.  Labs per my-chart.    [facility] for mental health referral.  please call [phone].   You should be able to do video visit on phone since you do not have computer.    Exercise, yoga, deep breathing.    May try medication lexapro 1/2 tablet initially  side effects with hydroxyzine at 25 mg - with groggy - try 1/2 tab if panic attack    Recheck with Primary in next few weeks    Discussed rheumatology abnormal labs may not be cause of symptoms       CC chart to primary    Nova Patton MD  [facility]    Subjective     Denise L Soulier is a 58 year old who presents for Patient presents with:  Urgent Care  Pain: Burning pain all over body, was seen in December in UC, and at ED. Did tests and was referred to Rheumatology. Has appt tomorrow but now having pins and needles sensations    an established patient of [facility].    Here for concerns for pins & needles   Mostly in tips of hands, feet.    Difficulty controlling reflux -   Has recheck with GI next week.    Having anxiety.    Tried hydroxyzine - felt groggy.  Did not take Lexapro. As nervous of side effects.        Review of Systems      Reveiwed ER visit     Medical Decision Making:  This then middle-aged female presents with tingling joint pain and reflex problems a month ago she underwent EGD which was negative she has been taking Protonix twice a day but ran out she did see her primary 2 weeks ago who did rheumatoid studies and found that her double-stranded DNA was  slightly elevated was elevated and her family was positive sed rate and CRP was negative she was scheduled to see rheumatology in about a week.  She does admit to be very anxious at times and this seems to make things work.  She has no anorexic or bulimic history.  She has had thyroid studies done which have been normal.  I did review her labs from the clinic and did not feel we needed any more done at this time.  I will place patient on Carafate to help with her esophagitis and also she will start escitalopram she has not felt she should make a follow-up with her PMD in the next week and keep her appointment with rheumatology as scheduled.  If symptoms change or worsen recheck in the ED     Diagnosis:      ICD-10-CM     1. Gastroesophageal reflux disease with esophagitis without hemorrhage  K21.00         2. Anxiety  F41.9        Component      Latest Ref Rng 11/18/2023  10:36 AM 12/11/2023  10:13 AM   WBC      4.0 - 11.0 10e3/uL 5.3     RBC Count      3.80 - 5.20 10e6/uL 4.47     Hemoglobin      11.7 - 15.7 g/dL 13.7     Hematocrit      35.0 - 47.0 % 42.1     MCV      78 - 100 fL 94     MCH      26.5 - 33.0 pg 30.6     MCHC      31.5 - 36.5 g/dL 32.5     RDW      10.0 - 15.0 % 12.2     Platelet Count      150 - 450 10e3/uL 405     % Neutrophils      % 66     % Lymphocytes      % 24     % Monocytes      % 7     % Eosinophils      % 2     % Basophils      % 1     % Immature Granulocytes      % 0     NRBCs per 100 WBC      <1 /100 0     Absolute Neutrophils      1.6 - 8.3 10e3/uL 3.5     Absolute Lymphocytes      0.8 - 5.3 10e3/uL 1.3     Absolute Monocytes      0.0 - 1.3 10e3/uL 0.4     Absolute Eosinophils      0.0 - 0.7 10e3/uL 0.1     Absolute Basophils      0.0 - 0.2 10e3/uL 0.0     Absolute Immature Granulocytes      <=0.4 10e3/uL 0.0     Absolute NRBCs      10e3/uL 0.0     Sodium      135 - 145 mmol/L 137     Potassium      3.4 - 5.3 mmol/L 3.7     Carbon Dioxide (CO2)      22 - 29 mmol/L 28     Anion Gap       "7 - 15 mmol/L 10     Urea Nitrogen      6.0 - 20.0 mg/dL 9.7     Creatinine      0.51 - 0.95 mg/dL 0.74     GFR Estimate      >60 mL/min/1.73m2 >90     Calcium      8.6 - 10.0 mg/dL 9.5     Chloride      98 - 107 mmol/L 99     Glucose      70 - 99 mg/dL 100 (H)     Alkaline Phosphatase      40 - 150 U/L 66     AST      0 - 45 U/L 29     ALT      0 - 50 U/L 16     Protein Total      6.4 - 8.3 g/dL 7.2     Albumin      3.5 - 5.2 g/dL 4.5     Bilirubin Total      <=1.2 mg/dL 1.0     ASHKAN interpretation      Negative   Positive !    ASHKAN pattern 1  Homogeneous    ASHKAN titer 1  1:160    Lipase      13 - 60 U/L 32     Troponin T, High Sensitivity      <=14 ng/L 6     CRP Inflammation      <5.00 mg/L  <3.00    Sed Rate      0 - 30 mm/hr  9    DNA-ds      <10.0 IU/mL  13.0 (H)    Cyclic Citrullinated Peptide Antibody, IgG      <7.0 U/mL  1.5    Rheumatoid Factor      <14 IU/mL  <10       Legend:  (H) High  ! Abnormal      Objective    /82   Pulse 85   Temp 98  F (36.7  C) (Temporal)   Resp 26   Ht 1.676 m (5' 6\")   Wt 51.7 kg (114 lb)   LMP 10/23/2015   SpO2 97%   BMI 18.40 kg/m    Physical Exam  Vitals reviewed.   Constitutional:       Appearance: Normal appearance. She is not ill-appearing.   Cardiovascular:      Rate and Rhythm: Normal rate and regular rhythm.      Pulses: Normal pulses.      Heart sounds: Normal heart sounds.   Pulmonary:      Effort: Pulmonary effort is normal.      Breath sounds: Normal breath sounds.   Neurological:      Mental Status: She is alert.                  "

## 2024-01-03 NOTE — PATIENT INSTRUCTIONS
Pins & needles can be anxiety  Screening electrolytes, magnesium, CK muscle enzymes.  Labs per my-chart.    [facility] for mental health referral.  please call [phone].   You should be able to do video visit on phone since you do not have computer.    Exercise, yoga, deep breathing.    May try medication lexapro 1/2 tablet initially  side effects with hydroxyzine at 25 mg - with groggy - try 1/2 tab if panic attack    Recheck with Primary in next few weeks    Discussed rheumatology abnormal labs may not be cause of symptoms

## 2024-01-05 NOTE — TELEPHONE ENCOUNTER
REFERRAL INFORMATION:  Referring Provider:  Ant Bardales DO  Referring Clinic:  [facility]   Reason for Visit/Diagnosis:  K21.9 (ICD-10-CM) - Gastroesophageal reflux disease, unspecified whether esophagitis present R10.13 (ICD-10-CM) - Epigastric pain     FUTURE VISIT INFORMATION:  Appointment Date: 1/10/24  Appointment Time: 3:00 PM      NOTES STATUS DETAILS   OFFICE NOTE from Referring Provider Internal 12/11/23 - [facility] OV with Ant Bardales DO   OFFICE NOTE from Other Specialist Internal 11/17/23 - UC OV with Danni Coronado PA-C at [facility]     9/18/23 - [facility] OV with Donna Wallace APRN CNP at [facility]    HOSPITAL DISCHARGE SUMMARY/  ED VISITS Internal 12/25/23 - [facility] ED visit with Charbel Tinsley MD    MEDICATION LIST Internal         ENDOSCOPY  Internal 11/22/23 - EGD    PERTINENT LABS Internal 1/3/24 - BMP; Magnesium  11/18/23 - CBC/diff   PATHOLOGY REPORTS (RELATED) Internal 11/22/23 - EGD bx    IMAGING (CT, MRI, EGD, MRCP, Small Bowel Follow Through/SBT, MR/CT Enterography) Internal CT Abdomen Pelvis - 12/13/23

## 2024-01-08 ENCOUNTER — HOSPITAL ENCOUNTER (OUTPATIENT)
Dept: GENERAL RADIOLOGY | Facility: HOSPITAL | Age: 59
Discharge: HOME | End: 2024-01-08
Attending: INTERNAL MEDICINE
Payer: COMMERCIAL

## 2024-01-08 ENCOUNTER — OFFICE VISIT (OUTPATIENT)
Dept: RHEUMATOLOGY | Facility: CLINIC | Age: 59
End: 2024-01-08
Payer: COMMERCIAL

## 2024-01-08 VITALS
OXYGEN SATURATION: 98 % | SYSTOLIC BLOOD PRESSURE: 120 MMHG | WEIGHT: 119.2 LBS | BODY MASS INDEX: 19.24 KG/M2 | HEART RATE: 78 BPM | DIASTOLIC BLOOD PRESSURE: 70 MMHG

## 2024-01-08 DIAGNOSIS — R76.8 ANA POSITIVE: ICD-10-CM

## 2024-01-08 DIAGNOSIS — R76.8 DS DNA ANTIBODY POSITIVE: ICD-10-CM

## 2024-01-08 DIAGNOSIS — M06.9 MATERNAL RHEUMATOID ARTHRITIS (H): ICD-10-CM

## 2024-01-08 DIAGNOSIS — M25.50 POLYARTHRALGIA: ICD-10-CM

## 2024-01-08 DIAGNOSIS — O99.891 MATERNAL RHEUMATOID ARTHRITIS (H): ICD-10-CM

## 2024-01-08 DIAGNOSIS — M25.50 POLYARTHRALGIA: Primary | ICD-10-CM

## 2024-01-08 LAB
BASOPHILS # BLD AUTO: 0 10E3/UL (ref 0–0.2)
BASOPHILS NFR BLD AUTO: 1 %
EOSINOPHIL # BLD AUTO: 0.1 10E3/UL (ref 0–0.7)
EOSINOPHIL NFR BLD AUTO: 2 %
ERYTHROCYTE [DISTWIDTH] IN BLOOD BY AUTOMATED COUNT: 12.1 % (ref 10–15)
HCT VFR BLD AUTO: 40 % (ref 35–47)
HGB BLD-MCNC: 14 G/DL (ref 11.7–15.7)
IMM GRANULOCYTES # BLD: 0 10E3/UL
IMM GRANULOCYTES NFR BLD: 0 %
LYMPHOCYTES # BLD AUTO: 1.4 10E3/UL (ref 0.8–5.3)
LYMPHOCYTES NFR BLD AUTO: 27 %
MCH RBC QN AUTO: 31.7 PG (ref 26.5–33)
MCHC RBC AUTO-ENTMCNC: 35 G/DL (ref 31.5–36.5)
MCV RBC AUTO: 91 FL (ref 78–100)
MONOCYTES # BLD AUTO: 0.6 10E3/UL (ref 0–1.3)
MONOCYTES NFR BLD AUTO: 11 %
NEUTROPHILS # BLD AUTO: 3.1 10E3/UL (ref 1.6–8.3)
NEUTROPHILS NFR BLD AUTO: 60 %
PLATELET # BLD AUTO: 387 10E3/UL (ref 150–450)
RBC # BLD AUTO: 4.41 10E6/UL (ref 3.8–5.2)
WBC # BLD AUTO: 5.1 10E3/UL (ref 4–11)

## 2024-01-08 PROCEDURE — 84460 ALANINE AMINO (ALT) (SGPT): CPT | Performed by: INTERNAL MEDICINE

## 2024-01-08 PROCEDURE — 86160 COMPLEMENT ANTIGEN: CPT | Performed by: INTERNAL MEDICINE

## 2024-01-08 PROCEDURE — 73560 X-RAY EXAM OF KNEE 1 OR 2: CPT | Mod: 50

## 2024-01-08 PROCEDURE — 99204 OFFICE O/P NEW MOD 45 MIN: CPT | Performed by: INTERNAL MEDICINE

## 2024-01-08 PROCEDURE — 84550 ASSAY OF BLOOD/URIC ACID: CPT | Performed by: INTERNAL MEDICINE

## 2024-01-08 PROCEDURE — 82040 ASSAY OF SERUM ALBUMIN: CPT | Performed by: INTERNAL MEDICINE

## 2024-01-08 PROCEDURE — 99000 SPECIMEN HANDLING OFFICE-LAB: CPT | Performed by: INTERNAL MEDICINE

## 2024-01-08 PROCEDURE — 86803 HEPATITIS C AB TEST: CPT | Performed by: INTERNAL MEDICINE

## 2024-01-08 PROCEDURE — 36415 COLL VENOUS BLD VENIPUNCTURE: CPT | Performed by: INTERNAL MEDICINE

## 2024-01-08 PROCEDURE — 86235 NUCLEAR ANTIGEN ANTIBODY: CPT | Performed by: INTERNAL MEDICINE

## 2024-01-08 PROCEDURE — 73130 X-RAY EXAM OF HAND: CPT | Mod: 50

## 2024-01-08 PROCEDURE — 85025 COMPLETE CBC W/AUTO DIFF WBC: CPT | Performed by: INTERNAL MEDICINE

## 2024-01-08 PROCEDURE — 86747 PARVOVIRUS ANTIBODY: CPT | Mod: 90 | Performed by: INTERNAL MEDICINE

## 2024-01-08 ASSESSMENT — PAIN SCALES - GENERAL: PAINLEVEL: MODERATE PAIN (4)

## 2024-01-08 NOTE — PROGRESS NOTES
This document was created using a software with less than 100% fidelity, at times resulting in unintended, even erroneous syntax and grammar.  The reader is advised to keep this under consideration while reviewing, interpreting this note.      Rheumatology Consult Note      Denise L Soulier     YOB: 1965 Age: 58 year old    Date of visit: 1/08/24    PCP: Tru Wallace    Chief Complaint   Patient presents with:  Consult      Assessment and Plan     Eleanor was seen today for consult.    Diagnoses and all orders for this visit:    Polyarthralgia  -     XR Hand Bilateral G/E 3 Views; Future  -     XR Knee AP/Lat Standing Bilateral; Future  -     WALLY antibody panel; Future  -     Hepatitis C antibody; Future  -     Uric acid; Future  -     Albumin level; Future  -     ALT; Standing  -     CBC with Platelets & Differential; Future  -     Complement C3; Future  -     Complement C4; Future  -     Parvovirus B19 antibodies IgG IgM; Future  -     WALLY antibody panel  -     Hepatitis C antibody  -     Uric acid  -     Albumin level  -     ALT  -     CBC with Platelets & Differential  -     Complement C3  -     Complement C4  -     Parvovirus B19 antibodies IgG IgM    ASHKAN positive  -     XR Hand Bilateral G/E 3 Views; Future  -     XR Knee AP/Lat Standing Bilateral; Future  -     WALLY antibody panel; Future  -     Hepatitis C antibody; Future  -     Uric acid; Future  -     Albumin level; Future  -     ALT; Standing  -     CBC with Platelets & Differential; Future  -     Complement C3; Future  -     Complement C4; Future  -     Parvovirus B19 antibodies IgG IgM; Future  -     WALLY antibody panel  -     Hepatitis C antibody  -     Uric acid  -     Albumin level  -     ALT  -     CBC with Platelets & Differential  -     Complement C3  -     Complement C4  -     Parvovirus B19 antibodies IgG IgM    Ds DNA antibody positive  -     WALLY antibody panel; Future  -     Hepatitis C antibody; Future  -     Complement C3;  Future  -     Complement C4; Future  -     Parvovirus B19 antibodies IgG IgM; Future  -     WALLY antibody panel  -     Hepatitis C antibody  -     Complement C3  -     Complement C4  -     Parvovirus B19 antibodies IgG IgM    Maternal rheumatoid arthritis (H)           This patient with polyarthralgias, positive ASHKAN low positive DS DNA,s negative rheumatoid factor anti-CCP antibody normal sed rate, requires further observations, workup as noted.  Will take x-rays of the hands and knees.  Labs as noted.  In the interim she is going to take acetaminophen sustained-release 2 tablets 3 times daily.  Today we discussed the significance of positive ASHKAN and the various associations and how 1 think through these.  The key question going forward would be if her joint symptoms are associated with connective tissue disease or inflammatory joint disease.  Her mom's history of rheumatoid arthritis to be kept under consideration in this context as well.    HPI Denise L Soulier is a 58 year old female  is seen today for evaluation of polyarthralgia, positive ASHKAN at 1: 160 and double-stranded DNA antibodies at 13.0 reference range less than 10.  Her rheumatoid factor, anti-CCP antibody, CK, sed rate were done and were all within normal range.  She describes herself previously in good health when in September she started experiencing joint pains.  She points to the PIPs MCPs knees and ankles as the primary site of pain.  There is no swelling that was associated.  She had noted stiffness in the morning that would typically last no more than 15 minutes.  She rated the pain as moderately severe.  Initially she took ibuprofen that was helpful but then she developed reflux symptoms and now is taking Tylenol which has provided her with some relief.  Around this time she is also felt more anxious than usual, had lost some weight, had requested antianxiety medication which was started.  She has not noted any rash, photosensitivity, mouth  ulcers, pleuritic symptoms, she is not aware of diagnosis of DVT PE.  She has not had a seizure disorder.  She has never been pregnant, that is by choice.  She has noted mother's history of rheumatoid arthritis.  She is not a smoker she works as a  lots of [employer] every day.  2019 or 18, hard for her to recall, she had triggering of the right thumb and underwent surgery for that that has not recurred or any other's finger similarly affected..  She is not aware of diagnosis of psoriasis ulcerative colitis Crohn's disease herself or the family.           Active Problem List     Patient Active Problem List   Diagnosis    GERD (gastroesophageal reflux disease)    Myoclonus    CARDIOVASCULAR SCREENING; LDL GOAL LESS THAN 160    Nocturnal myoclonus    Insomnia    Atopic rhinitis    Muscle aches    Trigger thumb of right hand    ASHKAN positive    Positive double stranded DNA antibody test     Past Medical History     Past Medical History:   Diagnosis Date    Allergies     seasonal spring and fall    GERD (gastroesophageal reflux disease)     Myoclonus Sept, 08    Sinus infection     chronic     Past Surgical History     Past Surgical History:   Procedure Laterality Date    ESOPHAGOSCOPY, GASTROSCOPY, DUODENOSCOPY (EGD), COMBINED N/A 11/22/2023    Procedure: ESOPHAGOGASTRODUODENOSCOPY, WITH BIOPSY;  Surgeon: Steve Champion MD;  Location: [facility]    RELEASE TRIGGER FINGER Right 12/12/2019    Procedure: Right trigger thumb release;  Surgeon: Pete Chandra MD;  Location: [facility]    SURGICAL HISTORY OF -       [facility]     Allergy     Allergies   Allergen Reactions    Pcn [Penicillins]      Current Medication List     Current Outpatient Medications   Medication Sig    CALCIUM + D OR 1 tab daily    escitalopram (LEXAPRO) 5 MG tablet Take 1 tablet (5 mg) by mouth daily    hydrOXYzine HCl (ATARAX) 25 MG tablet Take 1-2 tablets (25-50 mg) by mouth every 6 hours as needed for anxiety (insomnia) (Patient  not taking: Reported on 1/3/2024)    IBUPROFEN PO     MULTIVITAMIN TABS   OR 1 TABLET DAILY    pantoprazole (PROTONIX) 40 MG EC tablet Take 1 tablet (40 mg) by mouth 2 times daily (before meals)    sucralfate (CARAFATE) 1 GM tablet Take 1 tablet (1 g) by mouth 4 times daily     No current facility-administered medications for this visit.            Family History     Family History   Problem Relation Age of Onset    Lipids Mother     Asthma Mother     Chronic Obstructive Pulmonary Disease Mother     Rheumatoid Arthritis Mother     Bronchitis Mother     Alcohol/Drug Father     Diabetes Paternal Grandmother     Depression Sister     Substance Abuse Brother          Physical Exam     COMPREHENSIVE EXAMINATION:  Vitals:    01/08/24 1522   BP: 120/70   BP Location: Right arm   Cuff Size: Adult Regular   Pulse: 78   SpO2: 98%   Weight: 54.1 kg (119 lb 3.2 oz)     A well appearing alert oriented female. Vital data as noted above. Her eyes examined for inflammation/scleromalacia. ENT examined for oral mucositis, moisture, thrush, nasal deformity, external ear redness, deformity. Her neck is examined for suppleness and lymphadenopathy.  Cardiopulmonary examination without dyspnea at rest, use of accessory muscles of breathing, wheezing, edema, peripheral or central cyanosis.  Abdomen examined for softness, tenderness, obvious organomegaly.  Skin examined for heliotrope, malar area eruption, lupus pernio, periungual erythema, sclerodactyly, papules, erythema nodosum, purpura, nail pitting, onycholysis, and obvious psoriasis lesion. Neurological examination done for alertness, speech, facial symmetry,  tone and power in upper and lower extremities, and gait. The joint examination is performed for swelling, tenderness, warmth, erythema, and range of motion in the following joints: DIPs, PIPs, MCPs, wrists, first CMC's, elbows, shoulders, hips, knees, ankles, feet; spine for range of motion and paraspinal muscles for tenderness.  The salient normal / abnormal findings are appended.  She does not have a rash on her face there is no stomatitis there is no synovitis of palpable joints of upper and lower extremities.  She had minimal tenderness in the tibiotalar joints.  She does not have sclerodactyly.    Labs / Imaging (select studies)     Rheumatoid Factor   Date Value Ref Range Status   12/11/2023 <10 <14 IU/mL Final      Hemoglobin   Date Value Ref Range Status   11/18/2023 13.7 11.7 - 15.7 g/dL Final   07/11/2011 13.3 11.7 - 15.7 g/dL Final   03/14/2011 13.4 11.7 - 15.7 g/dL Final     Urea Nitrogen   Date Value Ref Range Status   01/03/2024 18.0 6.0 - 20.0 mg/dL Final   11/18/2023 9.7 6.0 - 20.0 mg/dL Final   09/18/2023 9.2 6.0 - 20.0 mg/dL Final   07/11/2011 7 5 - 24 mg/dL Final     Sed Rate   Date Value Ref Range Status   07/11/2011 9 0 - 20 mm/h Final     Erythrocyte Sedimentation Rate   Date Value Ref Range Status   12/11/2023 9 0 - 30 mm/hr Final     AST   Date Value Ref Range Status   11/18/2023 29 0 - 45 U/L Final     Comment:     Reference intervals for this test were updated on 6/12/2023 to more accurately reflect our healthy population. There may be differences in the flagging of prior results with similar values performed with this method. Interpretation of those prior results can be made in the context of the updated reference intervals.     Albumin   Date Value Ref Range Status   11/18/2023 4.5 3.5 - 5.2 g/dL Final     Alkaline Phosphatase   Date Value Ref Range Status   11/18/2023 66 40 - 150 U/L Final     Comment:     Reference intervals for this test were updated on 11/14/2023 to more accurately reflect our healthy population. There may be differences in the flagging of prior results with similar values performed with this method. Interpretation of those prior results can be made in the context of the updated reference intervals.     ALT   Date Value Ref Range Status   11/18/2023 16 0 - 50 U/L Final     Comment:      Reference intervals for this test were updated on 6/12/2023 to more accurately reflect our healthy population. There may be differences in the flagging of prior results with similar values performed with this method. Interpretation of those prior results can be made in the context of the updated reference intervals.       Rheumatoid Factor   Date Value Ref Range Status   12/11/2023 <10 <14 IU/mL Final          Immunization History     Immunization History   Administered Date(s) Administered    COVID-19 MONOVALENT 12+ (Pfizer) 04/30/2021, 05/21/2021    Influenza (IIV3) PF 10/14/2010, 10/20/2011, 10/19/2012, 10/03/2013    Influenza Vaccine 18-64 (Flublok) 09/19/2020, 10/16/2021    Influenza Vaccine >6 months,quad, PF 10/27/2015, 10/28/2019, 09/18/2023    TDAP Vaccine (Adacel) 03/07/2011    Zoster recombinant adjuvanted (SHINGRIX) 01/25/2020, 06/19/2020

## 2024-01-09 LAB
ALBUMIN SERPL BCG-MCNC: 4.4 G/DL (ref 3.5–5.2)
ALT SERPL W P-5'-P-CCNC: 18 U/L (ref 0–50)
C3 SERPL-MCNC: 100 MG/DL (ref 81–157)
C4 SERPL-MCNC: 21 MG/DL (ref 13–39)
HCV AB SERPL QL IA: NONREACTIVE
URATE SERPL-MCNC: 3.6 MG/DL (ref 2.4–5.7)

## 2024-01-10 ENCOUNTER — PRE VISIT (OUTPATIENT)
Dept: GASTROENTEROLOGY | Facility: CLINIC | Age: 59
End: 2024-01-10

## 2024-01-10 LAB
ENA SM IGG SER IA-ACNC: 0.7 U/ML
ENA SM IGG SER IA-ACNC: NEGATIVE
ENA SS-A AB SER IA-ACNC: 0.5 U/ML
ENA SS-A AB SER IA-ACNC: NEGATIVE
ENA SS-B IGG SER IA-ACNC: 0.7 U/ML
ENA SS-B IGG SER IA-ACNC: NEGATIVE
U1 SNRNP IGG SER IA-ACNC: 1.2 U/ML
U1 SNRNP IGG SER IA-ACNC: NEGATIVE

## 2024-01-12 LAB
B19V IGG SER IA-ACNC: 7.96 IV
B19V IGM SER IA-ACNC: 0.34 IV

## 2024-02-02 ENCOUNTER — VIRTUAL VISIT (OUTPATIENT)
Dept: FAMILY MEDICINE | Facility: CLINIC | Age: 59
End: 2024-02-02
Payer: COMMERCIAL

## 2024-02-02 DIAGNOSIS — M32.9 SYSTEMIC LUPUS ERYTHEMATOSUS, UNSPECIFIED SLE TYPE, UNSPECIFIED ORGAN INVOLVEMENT STATUS (H): ICD-10-CM

## 2024-02-02 DIAGNOSIS — F41.1 GAD (GENERALIZED ANXIETY DISORDER): Primary | ICD-10-CM

## 2024-02-02 PROCEDURE — 99214 OFFICE O/P EST MOD 30 MIN: CPT | Mod: 95 | Performed by: NURSE PRACTITIONER

## 2024-02-02 RX ORDER — BUSPIRONE HYDROCHLORIDE 5 MG/1
TABLET ORAL
Qty: 378 TABLET | Refills: 0 | Status: SHIPPED | OUTPATIENT
Start: 2024-02-02 | End: 2024-03-19

## 2024-02-02 RX ORDER — ESCITALOPRAM OXALATE 10 MG/1
TABLET ORAL
Qty: 119 TABLET | Refills: 0 | Status: SHIPPED | OUTPATIENT
Start: 2024-02-02 | End: 2024-03-19

## 2024-02-02 NOTE — PROGRESS NOTES
Elaenor is a 58 year old who is being evaluated via a billable video visit.      How would you like to obtain your AVS? MyChart  If the video visit is dropped, the invitation should be resent by: Text to cell phone: [phone]  Will anyone else be joining your video visit? No          Assessment & Plan     EVAN (generalized anxiety disorder)  Increasing anxious symptoms causing GERD flares; discussed changes in lexapro and additional medication for anxiety; increase lexapro 20 mg ; add buspar taper to 15 mg BID  - escitalopram (LEXAPRO) 10 MG tablet  Dispense: 119 tablet; Refill: 0  - busPIRone (BUSPAR) 5 MG tablet  Dispense: 378 tablet; Refill: 0  -follow up in 6-8 weeks    Systemic lupus erythematosus, unspecified SLE type, unspecified organ involvement status (H)  Stable; following Rheumatology                     Subjective   Eleanor is a 58 year old, presenting for the following health issues:  Follow Up (Follow Up )      2/2/2024     2:05 PM   Additional Questions   Roomed by Negra Bishop   58 year old  year old female with PMH   Patient Active Problem List   Diagnosis Code    GERD (gastroesophageal reflux disease) K21.9    Myoclonus G25.3    CARDIOVASCULAR SCREENING; LDL GOAL LESS THAN 160 Z13.6    Nocturnal myoclonus G47.69    Insomnia G47.00    Atopic rhinitis J30.9    Muscle aches M79.10    Trigger thumb of right hand M65.311    ASHKAN positive R76.8    Positive double stranded DNA antibody test R76.8     in clinic for acute office visit related to/establish care/to discuss     - Anxiety: increased stressors over the past several years including the death of her mother in 2018; [place] [facility], new dx Lupus; work stress; has never had this kind of feeling before ; was [facility] in [facility] 1/3/2024 started low dose Lexapro; initially effective       GERD: taking ppi ; has increased symptoms w/anxiety; not taking          8/15/2011    10:36 AM   EVAN-7 SCORE   Total Score 5           History of  Present Illness       Reason for visit:  Follow up GERD, joint pain, axietyShe consumes 0 sweetened beverage(s) daily.She exercises with enough effort to increase her heart rate 10 to 19 minutes per day.  She exercises with enough effort to increase her heart rate 3 or less days per week.   She is taking medications regularly.                   Objective           Vitals:  No vitals were obtained today due to virtual visit.    Physical Exam   GENERAL: alert and no distress  EYES: Eyes grossly normal to inspection.  No discharge or erythema, or obvious scleral/conjunctival abnormalities.  RESP: No audible wheeze, cough, or visible cyanosis.    SKIN: Visible skin clear. No significant rash, abnormal pigmentation or lesions.  NEURO: Cranial nerves grossly intact.  Mentation and speech appropriate for age.  PSYCH: Appropriate affect, tone, and pace of words          Video-Visit Details    Type of service:  Video Visit     Joined the call at 2/2/2024, 2:42:07 pm.  Left the call at 2/2/2024, 2:58:52 pm.  You were on the call for 16 minutes 44 seconds .    Originating Location (pt. Location): Home    Distant Location (provider location):  On-site  Platform used for Video Visit: Mala  Signed Electronically by: CALLIE Llanos CNP

## 2024-02-23 ENCOUNTER — HOSPITAL ENCOUNTER (EMERGENCY)
Facility: CLINIC | Age: 59
Discharge: HOME | End: 2024-02-23
Admitting: EMERGENCY MEDICINE
Payer: COMMERCIAL

## 2024-02-23 VITALS
RESPIRATION RATE: 16 BRPM | SYSTOLIC BLOOD PRESSURE: 136 MMHG | TEMPERATURE: 97.3 F | HEART RATE: 69 BPM | DIASTOLIC BLOOD PRESSURE: 67 MMHG | OXYGEN SATURATION: 100 %

## 2024-02-23 DIAGNOSIS — R20.8 BURNING SENSATION: ICD-10-CM

## 2024-02-23 PROCEDURE — 99283 EMERGENCY DEPT VISIT LOW MDM: CPT

## 2024-02-23 PROCEDURE — 250N000013 HC RX MED GY IP 250 OP 250 PS 637: Performed by: EMERGENCY MEDICINE

## 2024-02-23 RX ORDER — LORAZEPAM 0.5 MG/1
0.5 TABLET ORAL EVERY 6 HOURS PRN
Qty: 10 TABLET | Refills: 0 | Status: SHIPPED | OUTPATIENT
Start: 2024-02-23 | End: 2024-02-23

## 2024-02-23 RX ORDER — LORAZEPAM 0.5 MG/1
0.5 TABLET ORAL EVERY 4 HOURS PRN
Status: DISCONTINUED | OUTPATIENT
Start: 2024-02-23 | End: 2024-02-23 | Stop reason: HOSPADM

## 2024-02-23 RX ORDER — LORAZEPAM 0.5 MG/1
0.5 TABLET ORAL EVERY 6 HOURS PRN
Qty: 10 TABLET | Refills: 0 | Status: SHIPPED | OUTPATIENT
Start: 2024-02-23 | End: 2024-03-19

## 2024-02-23 RX ADMIN — LORAZEPAM 0.5 MG: 0.5 TABLET ORAL at 16:32

## 2024-02-23 NOTE — ED TRIAGE NOTES
Pt reports burning pain all over body. She states this has been going on since October they did tests and she has follow up appt on monday

## 2024-02-23 NOTE — ED PROVIDER NOTES
History     Chief Complaint:  generalized pain and Anxiety       HPI   Denise L Soulier is a 58 year old female Patient presented with generalized weakness.the patient reports that she has had a burning sensation over the past few months. She reports that she has seen a doctor about  this issues and they have done CT scans and blood test. She has a follow up on monday to go over her blood test. She does note that she has a new diagnose of lupus in my chart that was not discussed with her. She reports that stress does make her burning sensation worse. Her pain comes and goes but today it has been constant. She has not been sleeping. She thinks she gets about 5 hours a night. Se has also lost weight since this has started. She does add that it is hard to walk because of her pain. She does not have any focal weakness. She denies drug and alcohol use.       Independent Historian:    None    Review of External Notes:  Laboratory testing and prior visits as well as clinic visits    Medications:    Buspar   Lexapro   Atarax   Protonix  Carafate     Past Medical History:    Allergies   GERD  Myoclonus   Sinus infection     Past Surgical History:    Release trigger finger   Removal of wisdom teeth      Physical Exam   Patient Vitals for the past 24 hrs:   BP Temp Temp src Pulse Resp SpO2   02/23/24 1611 136/67 97.3  F (36.3  C) Temporal 69 16 100 %        Physical Exam  GENERAL: well developed, pleasant  HEAD: atraumatic  EYES: pupils reactive, extraocular muscles intact, conjunctivae normal  ENT:  mucus membranes moist  NECK:  trachea midline, normal range of motion  RESPIRATORY: no tachypnea, breath sounds clear to auscultation   CVS: normal S1/S2, no murmurs, intact distal pulses  ABDOMEN: soft, nontender, nondistention  MUSCULOSKELETAL: no deformities  SKIN: warm and dry, no acute rashes or ulceration  NEURO: GCS 15, cranial nerves intact, alert and oriented x3  PSYCH:  Mood/affect normal     Emergency Department  Course     Laboratory:  Labs Ordered and Resulted from Time of ED Arrival to Time of ED Departure - No data to display     Emergency Department Course & Assessments:     Interventions:  Medications   LORazepam (ATIVAN) tablet 0.5 mg (0.5 mg Oral $Given 2/23/24 1632)        Assessments:  1630 I obtained history and examined the patient as noted above.     Independent Interpretation (X-rays, CTs, rhythm strip):  None    Consultations/Discussion of Management or Tests:  None       Social Determinants of Health affecting care:  None     Disposition:  The patient was discharged to home.     Impression & Plan      Medical Decision Making:    Patient presents with a burning sensation throughout her body.  She notes looking in her chart today and seeing [facility] and notes that her symptoms got a bit worse after seeing this.  She has an upcoming appointment to see rheumatology but no one is called her to give her a diagnosis.  She denies any new symptoms he is sound to be a waxing waning symptoms that she is dealt with in the past.  She has no focal deficits.  I reviewed her labs suggesting new diagnosis of lupus.  Discussed with her likely lupus although will be confirmed when seeing her rheumatologist.  She was given Ativan to help control her symptoms.  She can follow-up as noted.  Discussed precautions with her.    Critical Care time:  was 0 minutes for this patient excluding procedures.    Diagnosis:    ICD-10-CM    1. Burning sensation  R20.8            Discharge Medications:  New Prescriptions    LORAZEPAM (ATIVAN) 0.5 MG TABLET    Take 1 tablet (0.5 mg) by mouth every 6 hours as needed for anxiety          Scribe Disclosure:  KELVIN, Alison Lopez, am serving as a scribe at 4:36 PM on 2/23/2024 to document services personally performed by No att. providers found based on my observations and the provider's statements to me.  2/23/2024   No att. providers found              Frederick Perez MD  02/23/24 8770

## 2024-02-26 ENCOUNTER — OFFICE VISIT (OUTPATIENT)
Dept: RHEUMATOLOGY | Facility: CLINIC | Age: 59
End: 2024-02-26
Payer: COMMERCIAL

## 2024-02-26 VITALS
WEIGHT: 116.4 LBS | DIASTOLIC BLOOD PRESSURE: 70 MMHG | HEART RATE: 70 BPM | BODY MASS INDEX: 18.79 KG/M2 | OXYGEN SATURATION: 98 % | SYSTOLIC BLOOD PRESSURE: 118 MMHG

## 2024-02-26 DIAGNOSIS — R76.8 ANA POSITIVE: ICD-10-CM

## 2024-02-26 DIAGNOSIS — R76.8 DS DNA ANTIBODY POSITIVE: ICD-10-CM

## 2024-02-26 DIAGNOSIS — M25.50 POLYARTHRALGIA: Primary | ICD-10-CM

## 2024-02-26 PROCEDURE — 99214 OFFICE O/P EST MOD 30 MIN: CPT | Performed by: INTERNAL MEDICINE

## 2024-02-26 PROCEDURE — G2211 COMPLEX E/M VISIT ADD ON: HCPCS | Performed by: INTERNAL MEDICINE

## 2024-02-26 RX ORDER — ACETAMINOPHEN 500 MG
500 TABLET ORAL EVERY 6 HOURS PRN
COMMUNITY
Start: 2024-01-15

## 2024-02-26 RX ORDER — HYDROXYCHLOROQUINE SULFATE 200 MG/1
200 TABLET, FILM COATED ORAL 2 TIMES DAILY
Qty: 60 TABLET | Refills: 3 | Status: SHIPPED | OUTPATIENT
Start: 2024-02-26 | End: 2024-03-27

## 2024-02-26 NOTE — PROGRESS NOTES
Rheumatology follow-up visit note     Eleanor is a 58 year old female presents today for follow-up.    Eleanor was seen today for recheck.    Diagnoses and all orders for this visit:    Polyarthralgia  -     hydroxychloroquine (PLAQUENIL) 200 MG tablet; Take 1 tablet (200 mg) by mouth 2 times daily for 30 days    ASHKAN positive  -     hydroxychloroquine (PLAQUENIL) 200 MG tablet; Take 1 tablet (200 mg) by mouth 2 times daily for 30 days    Ds DNA antibody positive  -     hydroxychloroquine (PLAQUENIL) 200 MG tablet; Take 1 tablet (200 mg) by mouth 2 times daily for 30 days    This patient with polyarthralgias, positive ASHKAN dsDNA which is low titer,  Continues to be troubled by arthralgias.  There is no clear profile her that would be consistent with definite inflammatory joint disease however the discrepancy between the symptoms and the findings keeping that in mind I have asked her to consider a trial of hydroxychloroquine, she is aware that it may take 3 to 4 months before she knows if it is done any good for her major side effects including ocular GI cutaneous were outlined literature provided.  X-ray of the hands raise the question of osteonecrosis of right lunate.  This will be kept under consideration going forward.    Follow up in 3 months.  The longitudinal plan of care for the diagnosis(es)/condition(s) as documented were addressed during this visit. Due to the added complexity in care, I will continue to support Eleanor in the subsequent management and with ongoing continuity of care.    HPI    Denise L Soulier is a 58 year old female is here for follow-up of  polyarthralgia, positive ASHKAN at 1: 160 and double-stranded DNA antibodies at 13.0 reference range less than 10.  Her rheumatoid factor, anti-CCP antibody, CK, sed rate were done and were all within normal range.  Since her previous visit she has continued to have pain.  This is in her first MCP areas more with activity type pain  the knees  on the other hand are temporary manage with the pain is worse addressed first thing in the morning and better with the activities.  Her workup so far is reviewed and reassuring.  Her parvo IgG were positive IgM negative.  She does spend quite some time with her nephews and nieces.  She describes herself previously in good health when in September she started experiencing joint pains.  She points to the PIPs MCPs knees and ankles as the primary site of pain.  There is no swelling that was associated.  She had noted stiffness in the morning that would typically last no more than 15 minutes.  She rated the pain as moderately severe.  Initially she took ibuprofen that was helpful but then she developed reflux symptoms and now is taking Tylenol which has provided her with some relief.  Around this time she is also felt more anxious than usual, had lost some weight, had requested antianxiety medication which was started.  She has not noted any rash, photosensitivity, mouth ulcers, pleuritic symptoms, she is not aware of diagnosis of DVT PE.  She has not had a seizure disorder.  She has never been pregnant, that is by choice.  She has noted mother's history of rheumatoid arthritis.  She is not a smoker she works as a  lots of [employer] every day.  2019 or 18, hard for her to recall, she had triggering of the right thumb and underwent surgery for that that has not recurred or any other's finger similarly affected..  She is not aware of diagnosis of psoriasis ulcerative colitis Crohn's disease herself or the family.   She has had triggering of the thumb on the right side and went on to have orthopedic surgery this was in 2019.    DETAILED EXAMINATION  02/26/24  :    Vitals:    02/26/24 1136   BP: 118/70   Pulse: 70   SpO2: 98%   Weight: 52.8 kg (116 lb 6.4 oz)     Alert oriented. Head including the face is examined for malar rash, heliotropes, scarring, lupus pernio. Eyes examined for redness such as in  episcleritis/scleritis, periorbital lesions.   Neck/ Face examined for parotid gland swelling, range of motion of neck.  Left upper and lower and right upper and lower extremities examined for tenderness, swelling, warmth of the appendicular joints, range of motion, edema, rash.  Some of the important findings included: she does not have evidence of synovitis in the palpable joints of the upper extremities with the exception of the first MCPs bilaterally more so on the right side.,  Scar on the right flexor tendon aspect of the right thumb. .  No significant deformities of the digits.  no Heberden nodes.  Range of motion of the shoulders   show full abduction.  No JLT effusion or warmth of the knees.  she does not have dactylitis of the digits.     Patient Active Problem List    Diagnosis Date Noted    Systemic lupus erythematosus (H) 02/02/2024     Priority: Medium    ASHKAN positive 12/13/2023     Priority: Medium    Positive double stranded DNA antibody test 12/13/2023     Priority: Medium    Trigger thumb of right hand 11/07/2019     Priority: Medium     Added automatically from request for surgery [id number]      Insomnia 08/15/2011     Priority: Medium     Normal sleep study (no abnormal PLM, or sleep apnea) 10/15/2011      Atopic rhinitis 08/15/2011     Priority: Medium     (Problem list name updated by automated process. Provider to review and confirm.)      Muscle aches 08/15/2011     Priority: Medium     Problem list name updated by automated process. Provider to review and confirm      Nocturnal myoclonus 03/07/2011     Priority: Medium    CARDIOVASCULAR SCREENING; LDL GOAL LESS THAN 160 05/09/2010     Priority: Medium    GERD (gastroesophageal reflux disease) 01/28/2010     Priority: Medium    Myoclonus 01/28/2010     Priority: Medium     Past Surgical History:   Procedure Laterality Date    ESOPHAGOSCOPY, GASTROSCOPY, DUODENOSCOPY (EGD), COMBINED N/A 11/22/2023    Procedure: ESOPHAGOGASTRODUODENOSCOPY, WITH  BIOPSY;  Surgeon: Steve Champion MD;  Location: [facility]    RELEASE TRIGGER FINGER Right 12/12/2019    Procedure: Right trigger thumb release;  Surgeon: Pete Chandra MD;  Location: [facility]    SURGICAL HISTORY OF -       [facility]      Past Medical History:   Diagnosis Date    Allergies     seasonal spring and fall    GERD (gastroesophageal reflux disease)     Myoclonus Sept, 08    Sinus infection     chronic     Allergies   Allergen Reactions    Pcn [Penicillins]      Current Outpatient Medications   Medication Sig Dispense Refill    busPIRone (BUSPAR) 5 MG tablet Take 1 tablet (5 mg) by mouth 2 times daily for 7 days, THEN 2 tablets (10 mg) 2 times daily for 7 days, THEN 3 tablets (15 mg) 2 times daily for 56 days. 378 tablet 0    CALCIUM + D OR 1 tab daily      escitalopram (LEXAPRO) 10 MG tablet Take 1 tablet (10 mg) by mouth daily for 7 days, THEN 2 tablets (20 mg) daily for 56 days. 119 tablet 0    hydrOXYzine HCl (ATARAX) 25 MG tablet Take 1-2 tablets (25-50 mg) by mouth every 6 hours as needed for anxiety (insomnia) (Patient not taking: Reported on 1/3/2024) 60 tablet 1    LORazepam (ATIVAN) 0.5 MG tablet Take 1 tablet (0.5 mg) by mouth every 6 hours as needed for anxiety 10 tablet 0    MULTIVITAMIN TABS   OR 1 TABLET DAILY      pantoprazole (PROTONIX) 40 MG EC tablet Take 1 tablet (40 mg) by mouth 2 times daily (before meals) 60 tablet 3    sucralfate (CARAFATE) 1 GM tablet Take 1 tablet (1 g) by mouth 4 times daily 42 tablet 0     family history includes Alcohol/Drug in her father; Asthma in her mother; Bronchitis in her mother; Chronic Obstructive Pulmonary Disease in her mother; Depression in her sister; Diabetes in her paternal grandmother; Lipids in her mother; Rheumatoid Arthritis in her mother; Substance Abuse in her brother.  Social Connections: Socially Isolated (11/9/2021)    Social Connection and Isolation Panel [NHANES]     Frequency of Communication with Friends and Family: Twice a  week     Frequency of Social Gatherings with Friends and Family: Once a week     Attends Church Services: Never     Active Member of Clubs or Organizations: No     Attends Club or Organization Meetings: Not on file     Marital Status: Never           WBC   Date Value Ref Range Status   07/11/2011 4.8 4.0 - 11.0 10e9/L Final     WBC Count   Date Value Ref Range Status   01/08/2024 5.1 4.0 - 11.0 10e3/uL Final     RBC Count   Date Value Ref Range Status   01/08/2024 4.41 3.80 - 5.20 10e6/uL Final   07/11/2011 4.38 3.8 - 5.2 10e12/L Final     Hemoglobin   Date Value Ref Range Status   01/08/2024 14.0 11.7 - 15.7 g/dL Final   07/11/2011 13.3 11.7 - 15.7 g/dL Final     Hematocrit   Date Value Ref Range Status   01/08/2024 40.0 35.0 - 47.0 % Final   07/11/2011 40.4 35.0 - 47.0 % Final     MCV   Date Value Ref Range Status   01/08/2024 91 78 - 100 fL Final   07/11/2011 92 78 - 100 fl Final     MCH   Date Value Ref Range Status   01/08/2024 31.7 26.5 - 33.0 pg Final   07/11/2011 30.4 26.5 - 33.0 pg Final     Platelet Count   Date Value Ref Range Status   01/08/2024 387 150 - 450 10e3/uL Final   07/11/2011 243 150 - 450 10e9/L Final     % Lymphocytes   Date Value Ref Range Status   01/08/2024 27 % Final   03/14/2011 45.6 20 - 48 % Final     AST   Date Value Ref Range Status   11/18/2023 29 0 - 45 U/L Final     Comment:     Reference intervals for this test were updated on 6/12/2023 to more accurately reflect our healthy population. There may be differences in the flagging of prior results with similar values performed with this method. Interpretation of those prior results can be made in the context of the updated reference intervals.     ALT   Date Value Ref Range Status   01/08/2024 18 0 - 50 U/L Final     Albumin   Date Value Ref Range Status   01/08/2024 4.4 3.5 - 5.2 g/dL Final     Alkaline Phosphatase   Date Value Ref Range Status   11/18/2023 66 40 - 150 U/L Final     Comment:     Reference intervals for  this test were updated on 11/14/2023 to more accurately reflect our healthy population. There may be differences in the flagging of prior results with similar values performed with this method. Interpretation of those prior results can be made in the context of the updated reference intervals.     Creatinine   Date Value Ref Range Status   01/03/2024 0.68 0.51 - 0.95 mg/dL Final   07/11/2011 0.83 0.52 - 1.04 mg/dL Final     GFR Estimate   Date Value Ref Range Status   01/03/2024 >90 >60 mL/min/1.73m2 Final   07/11/2011 75 >60 mL/min/1.7m2 Final     GFR Estimate If Black   Date Value Ref Range Status   07/11/2011 >90 >60 mL/min/1.7m2 Final     Sed Rate   Date Value Ref Range Status   07/11/2011 9 0 - 20 mm/h Final     Erythrocyte Sedimentation Rate   Date Value Ref Range Status   12/11/2023 9 0 - 30 mm/hr Final

## 2024-03-02 DIAGNOSIS — F41.1 GAD (GENERALIZED ANXIETY DISORDER): ICD-10-CM

## 2024-03-04 RX ORDER — BUSPIRONE HYDROCHLORIDE 5 MG/1
TABLET ORAL
OUTPATIENT
Start: 2024-03-04

## 2024-03-04 RX ORDER — ESCITALOPRAM OXALATE 10 MG/1
TABLET ORAL
Qty: 159 TABLET | OUTPATIENT
Start: 2024-03-04

## 2024-03-18 ASSESSMENT — ANXIETY QUESTIONNAIRES
8. IF YOU CHECKED OFF ANY PROBLEMS, HOW DIFFICULT HAVE THESE MADE IT FOR YOU TO DO YOUR WORK, TAKE CARE OF THINGS AT HOME, OR GET ALONG WITH OTHER PEOPLE?: NOT DIFFICULT AT ALL
1. FEELING NERVOUS, ANXIOUS, OR ON EDGE: SEVERAL DAYS
4. TROUBLE RELAXING: SEVERAL DAYS
6. BECOMING EASILY ANNOYED OR IRRITABLE: NOT AT ALL
GAD7 TOTAL SCORE: 4
7. FEELING AFRAID AS IF SOMETHING AWFUL MIGHT HAPPEN: NOT AT ALL
GAD7 TOTAL SCORE: 4
IF YOU CHECKED OFF ANY PROBLEMS ON THIS QUESTIONNAIRE, HOW DIFFICULT HAVE THESE PROBLEMS MADE IT FOR YOU TO DO YOUR WORK, TAKE CARE OF THINGS AT HOME, OR GET ALONG WITH OTHER PEOPLE: NOT DIFFICULT AT ALL
3. WORRYING TOO MUCH ABOUT DIFFERENT THINGS: SEVERAL DAYS
2. NOT BEING ABLE TO STOP OR CONTROL WORRYING: SEVERAL DAYS
5. BEING SO RESTLESS THAT IT IS HARD TO SIT STILL: NOT AT ALL
7. FEELING AFRAID AS IF SOMETHING AWFUL MIGHT HAPPEN: NOT AT ALL

## 2024-03-19 ENCOUNTER — OFFICE VISIT (OUTPATIENT)
Dept: FAMILY MEDICINE | Facility: CLINIC | Age: 59
End: 2024-03-19
Payer: COMMERCIAL

## 2024-03-19 VITALS
SYSTOLIC BLOOD PRESSURE: 114 MMHG | BODY MASS INDEX: 19.4 KG/M2 | OXYGEN SATURATION: 99 % | DIASTOLIC BLOOD PRESSURE: 76 MMHG | RESPIRATION RATE: 14 BRPM | WEIGHT: 120.7 LBS | HEIGHT: 66 IN | TEMPERATURE: 97 F | HEART RATE: 74 BPM

## 2024-03-19 DIAGNOSIS — K21.9 GASTROESOPHAGEAL REFLUX DISEASE WITHOUT ESOPHAGITIS: ICD-10-CM

## 2024-03-19 DIAGNOSIS — Z13.1 ENCOUNTER FOR SCREENING FOR DIABETES MELLITUS: ICD-10-CM

## 2024-03-19 DIAGNOSIS — F41.1 GAD (GENERALIZED ANXIETY DISORDER): Primary | ICD-10-CM

## 2024-03-19 LAB — HBA1C MFR BLD: 5.1 % (ref 0–5.6)

## 2024-03-19 PROCEDURE — 96127 BRIEF EMOTIONAL/BEHAV ASSMT: CPT | Performed by: NURSE PRACTITIONER

## 2024-03-19 PROCEDURE — 99215 OFFICE O/P EST HI 40 MIN: CPT | Performed by: NURSE PRACTITIONER

## 2024-03-19 PROCEDURE — 83036 HEMOGLOBIN GLYCOSYLATED A1C: CPT | Performed by: NURSE PRACTITIONER

## 2024-03-19 PROCEDURE — 36415 COLL VENOUS BLD VENIPUNCTURE: CPT | Performed by: NURSE PRACTITIONER

## 2024-03-19 RX ORDER — ESCITALOPRAM OXALATE 20 MG/1
20 TABLET ORAL DAILY
Qty: 90 TABLET | Refills: 3 | Status: SHIPPED | OUTPATIENT
Start: 2024-03-19

## 2024-03-19 RX ORDER — PANTOPRAZOLE SODIUM 20 MG/1
20 TABLET, DELAYED RELEASE ORAL
Qty: 45 TABLET | Refills: 0 | Status: SHIPPED | OUTPATIENT
Start: 2024-03-19

## 2024-03-19 RX ORDER — PANTOPRAZOLE SODIUM 20 MG/1
20 TABLET, DELAYED RELEASE ORAL
Qty: 90 TABLET | OUTPATIENT
Start: 2024-03-19

## 2024-03-19 RX ORDER — SUCRALFATE 1 G/1
1 TABLET ORAL 4 TIMES DAILY PRN
Qty: 90 TABLET | Refills: 0 | Status: SHIPPED | OUTPATIENT
Start: 2024-03-19

## 2024-03-19 RX ORDER — SUCRALFATE 1 G/1
TABLET ORAL
Qty: 368 TABLET | OUTPATIENT
Start: 2024-03-19

## 2024-03-19 RX ORDER — BUSPIRONE HYDROCHLORIDE 15 MG/1
15 TABLET ORAL 2 TIMES DAILY
Qty: 180 TABLET | Refills: 0 | Status: SHIPPED | OUTPATIENT
Start: 2024-03-19 | End: 2024-06-27

## 2024-03-19 ASSESSMENT — PAIN SCALES - GENERAL: PAINLEVEL: NO PAIN (0)

## 2024-03-19 NOTE — PROGRESS NOTES
Assessment & Plan     EVAN (generalized anxiety disorder)      8/15/2011    10:36 AM 3/18/2024     3:40 PM   EVAN-7 SCORE   Total Score 5    Total Score  4 (minimal anxiety)   Total Score  4   Stable; no SI, intent or plan; plan follow up in 6-8 weeks  - busPIRone (BUSPAR) 15 MG tablet  Dispense: 180 tablet; Refill: 0  - escitalopram (LEXAPRO) 20 MG tablet  Dispense: 90 tablet; Refill: 3    Encounter for screening for diabetes mellitus  - Hemoglobin A1c  - Hemoglobin A1c    Gastroesophageal reflux disease without esophagitis  EGD completed 11/23:                            - Follow an antireflux regimen.                             - Use Protonix (pantoprazole) 40 mg PO BID for 2                             months.                             - She reports pantoprazole worked better for her                             previously. Would try 40 mg PO BID. If no                             improvement would refer to GI clinic. No reason for                             weight loss found on this exam   Continue current regimen  - pantoprazole (PROTONIX) 20 MG EC tablet  Dispense: 45 tablet; Refill: 0  - sucralfate (CARAFATE) 1 GM tablet  Dispense: 90 tablet; Refill: 0    40 minutes of the appointment were spent evaluating and developing a treatment plan for her additional concern(s).                    Gabriel Webster is a 58 year old, presenting for the following health issues:  RECHECK (GERD, joint issues and anxiety)      3/19/2024     8:03 AM   Additional Questions   Roomed by Anisa duran     History of Present Illness       Mental Health Follow-up:  Patient presents to follow-up on Anxiety.    Patient's anxiety since last visit has been:  Medium  The patient is having other symptoms associated with anxiety.  Any significant life events: job concerns  Patient is feeling anxious or having panic attacks.  Patient has no concerns about alcohol or drug use.    Reason for visit:  Follow up on GERD, joint pain,  "anxiety.    She eats 4 or more servings of fruits and vegetables daily.She consumes 0 sweetened beverage(s) daily.She exercises with enough effort to increase her heart rate 20 to 29 minutes per day.  She exercises with enough effort to increase her heart rate 5 days per week.   She is taking medications regularly.         8/15/2011    10:36 AM 3/18/2024     3:40 PM   EVAN-7 SCORE   Total Score 5    Total Score  4 (minimal anxiety)   Total Score  4                       Objective    /76 (BP Location: Right arm, Patient Position: Sitting, Cuff Size: Adult Regular)   Pulse 74   Temp 97  F (36.1  C) (Temporal)   Resp 14   Ht 1.676 m (5' 6\")   Wt 54.7 kg (120 lb 11.2 oz)   LMP 10/23/2015   SpO2 99%   BMI 19.48 kg/m    Body mass index is 19.48 kg/m .  Physical Exam               Signed Electronically by: CALLIE Llanos CNP    "

## 2024-06-27 DIAGNOSIS — F41.1 GAD (GENERALIZED ANXIETY DISORDER): ICD-10-CM

## 2024-06-27 RX ORDER — BUSPIRONE HYDROCHLORIDE 15 MG/1
15 TABLET ORAL 2 TIMES DAILY
Qty: 180 TABLET | Refills: 1 | Status: SHIPPED | OUTPATIENT
Start: 2024-06-27

## 2024-11-09 ENCOUNTER — HEALTH MAINTENANCE LETTER (OUTPATIENT)
Age: 59
End: 2024-11-09

## 2024-11-27 ENCOUNTER — ANCILLARY PROCEDURE (OUTPATIENT)
Dept: MAMMOGRAPHY | Facility: CLINIC | Age: 59
End: 2024-11-27
Attending: NURSE PRACTITIONER
Payer: COMMERCIAL

## 2024-11-27 DIAGNOSIS — Z12.31 VISIT FOR SCREENING MAMMOGRAM: ICD-10-CM

## 2024-11-27 PROCEDURE — 77063 BREAST TOMOSYNTHESIS BI: CPT | Mod: TC | Performed by: RADIOLOGY

## 2024-11-27 PROCEDURE — 77067 SCR MAMMO BI INCL CAD: CPT | Mod: TC | Performed by: RADIOLOGY

## 2025-07-05 ENCOUNTER — OFFICE VISIT (OUTPATIENT)
Dept: URGENT CARE | Facility: URGENT CARE | Age: 60
End: 2025-07-05
Payer: COMMERCIAL

## 2025-07-05 VITALS
DIASTOLIC BLOOD PRESSURE: 72 MMHG | BODY MASS INDEX: 19.93 KG/M2 | HEIGHT: 67 IN | TEMPERATURE: 97.4 F | SYSTOLIC BLOOD PRESSURE: 114 MMHG | OXYGEN SATURATION: 97 % | RESPIRATION RATE: 12 BRPM | HEART RATE: 72 BPM | WEIGHT: 127 LBS

## 2025-07-05 DIAGNOSIS — Z88.0 ALLERGY HISTORY, PENICILLIN: ICD-10-CM

## 2025-07-05 DIAGNOSIS — H66.90 EAR INFECTION: Primary | ICD-10-CM

## 2025-07-05 DIAGNOSIS — H66.012 NON-RECURRENT ACUTE SUPPURATIVE OTITIS MEDIA OF LEFT EAR WITH SPONTANEOUS RUPTURE OF TYMPANIC MEMBRANE: ICD-10-CM

## 2025-07-05 DIAGNOSIS — H60.392 OTHER INFECTIVE ACUTE OTITIS EXTERNA OF LEFT EAR: ICD-10-CM

## 2025-07-05 PROCEDURE — 3078F DIAST BP <80 MM HG: CPT | Performed by: INTERNAL MEDICINE

## 2025-07-05 PROCEDURE — 3074F SYST BP LT 130 MM HG: CPT | Performed by: INTERNAL MEDICINE

## 2025-07-05 PROCEDURE — 99213 OFFICE O/P EST LOW 20 MIN: CPT | Performed by: INTERNAL MEDICINE

## 2025-07-05 RX ORDER — LEVOFLOXACIN 500 MG/1
500 TABLET, FILM COATED ORAL DAILY
Qty: 10 TABLET | Refills: 0 | Status: SHIPPED | OUTPATIENT
Start: 2025-07-05 | End: 2025-07-15

## 2025-07-05 RX ORDER — NEOMYCIN SULFATE, POLYMYXIN B SULFATE AND HYDROCORTISONE 3.5; 10000; 1 MG/ML; [IU]/ML; MG/ML
3 SOLUTION AURICULAR (OTIC) 4 TIMES DAILY
Qty: 10 ML | Refills: 0 | Status: SHIPPED | OUTPATIENT
Start: 2025-07-05

## 2025-07-05 NOTE — PROGRESS NOTES
"ASSESSMENT AND PLAN:      ICD-10-CM    1. Ear infection  H66.90       2. Non-recurrent acute suppurative otitis media of left ear with spontaneous rupture of tympanic membrane  H66.012 neomycin-polymyxin-hydrocortisone (CORTISPORIN) 3.5-90360-5 otic solution      3. Other infective acute otitis externa of left ear  H60.392 levofloxacin (LEVAQUIN) 500 MG tablet      4. Allergy history, penicillin  Z88.0 neomycin-polymyxin-hydrocortisone (CORTISPORIN) 3.5-04203-5 otic solution     levofloxacin (LEVAQUIN) 500 MG tablet      Discussed with patient that she has purulent fluid obstructing view of tympanic membrane with some extra tissue growth in the ear canal.  This could be from a perforated tympanic membrane.  Unsure if related to her self irrigation for earwax.  At this point that ear canal also looks infected.    Allergy to penicillin so Levaquin given to cover the ear canal and middle ear infection.  Patient states she has used topical antibiotic eardrops before and prefers generic due to cost    Discussed she needs her ears rechecked to see the condition of the anatomy including tympanic membrane within a month.  CC chart to primary        Return in about 1 month (around 8/5/2025) for ear check.        Nova Patton MD  [facility]    Subjective     Denise L Soulier is a 59 year old who presents for Patient presents with:  Ear Problem: X 2 weeks   Left ear pain  Reports using OTC earwax removal  Hearing minimal w/ drainage and blood      an established patient of [facility].        Onset of symptoms was weeks  ago.    Current and Associated symptoms: left ear pain with difficulty hearing  Hx wax  Treatment measures tried include wax drop & irrigation  Then noticed blood so came here to the Urgent Care  Review of Systems        Objective    /72   Pulse 72   Temp 97.4  F (36.3  C) (Temporal)   Resp 12   Ht 1.702 m (5' 7\")   Wt 57.6 kg (127 lb)   LMP 10/23/2015   SpO2 97%   BMI " 19.89 kg/m    Physical Exam  Vitals reviewed.   Constitutional:       Appearance: Normal appearance.   HENT:      Right Ear: Tympanic membrane normal.      Ears:      Comments: left ear canal filled with purulent fluid with extra red tissue growth in canal  Neurological:      Mental Status: She is alert.

## 2025-07-05 NOTE — PROGRESS NOTES
Urgent Care Clinic Visit    Chief Complaint   Patient presents with    Ear Problem     X 2 weeks   Left ear pain  Reports using OTC earwax removal  Hearing minimal w/ drainage                  7/5/2025     9:14 AM   Additional Questions   Roomed by blane aldrich

## (undated) DEVICE — TOURNIQUET SGL BLADDER 18"X4" RED 5921-218-135

## (undated) DEVICE — BRUSH SURGICAL SCRUB W/4% CHG SOL 25ML 371073

## (undated) DEVICE — Device

## (undated) DEVICE — DRAPE STERI TOWEL LG 1010

## (undated) DEVICE — SOL NACL 0.9% IRRIG 500ML BOTTLE 2F7123

## (undated) DEVICE — PACK HAND CUSTOM ASC

## (undated) DEVICE — BNDG KLING 1" 2230

## (undated) DEVICE — SU ETHILON 4-0 PS-2 18" BLACK 1667H

## (undated) DEVICE — LINEN ORTHO PACK 5446

## (undated) DEVICE — LIGHT HANDLE X1 31140133

## (undated) DEVICE — GLOVE PROTEXIS POWDER FREE SMT 6.5  2D72PT65X

## (undated) DEVICE — GLOVE PROTEXIS BLUE W/NEU-THERA 6.5  2D73EB65

## (undated) DEVICE — PREP CHLORAPREP 26ML TINTED ORANGE  260815

## (undated) RX ORDER — ONDANSETRON 4 MG/1
TABLET, ORALLY DISINTEGRATING ORAL
Status: DISPENSED
Start: 2023-11-22

## (undated) RX ORDER — FENTANYL CITRATE 50 UG/ML
INJECTION, SOLUTION INTRAMUSCULAR; INTRAVENOUS
Status: DISPENSED
Start: 2023-11-22

## (undated) RX ORDER — LIDOCAINE HYDROCHLORIDE AND EPINEPHRINE 10; 10 MG/ML; UG/ML
INJECTION, SOLUTION INFILTRATION; PERINEURAL
Status: DISPENSED
Start: 2019-12-12